# Patient Record
Sex: FEMALE | Race: WHITE | NOT HISPANIC OR LATINO | URBAN - METROPOLITAN AREA
[De-identification: names, ages, dates, MRNs, and addresses within clinical notes are randomized per-mention and may not be internally consistent; named-entity substitution may affect disease eponyms.]

---

## 2017-02-24 ENCOUNTER — ALLIED HEALTH/NURSE VISIT (OUTPATIENT)
Dept: OBGYN | Facility: CLINIC | Age: 52
End: 2017-02-24
Payer: COMMERCIAL

## 2017-02-24 DIAGNOSIS — Z23 NEED FOR VACCINATION: Primary | ICD-10-CM

## 2017-02-24 PROCEDURE — 90746 HEPB VACCINE 3 DOSE ADULT IM: CPT | Mod: ZF

## 2017-02-24 PROCEDURE — 25000128 H RX IP 250 OP 636: Mod: ZF

## 2017-02-24 PROCEDURE — 90471 IMMUNIZATION ADMIN: CPT | Mod: ZF

## 2017-06-24 ENCOUNTER — HEALTH MAINTENANCE LETTER (OUTPATIENT)
Age: 52
End: 2017-06-24

## 2017-08-23 ENCOUNTER — RADIANT APPOINTMENT (OUTPATIENT)
Dept: MAMMOGRAPHY | Facility: CLINIC | Age: 52
End: 2017-08-23

## 2017-08-23 DIAGNOSIS — Z12.31 VISIT FOR SCREENING MAMMOGRAM: ICD-10-CM

## 2017-08-28 ASSESSMENT — ENCOUNTER SYMPTOMS
HOT FLASHES: 1
NIGHT SWEATS: 1
ARTHRALGIAS: 1
FATIGUE: 1
ALTERED TEMPERATURE REGULATION: 1
NECK PAIN: 1

## 2017-08-28 ASSESSMENT — ANXIETY QUESTIONNAIRES
GAD7 TOTAL SCORE: 2
1. FEELING NERVOUS, ANXIOUS, OR ON EDGE: NOT AT ALL
GAD7 TOTAL SCORE: 2
4. TROUBLE RELAXING: SEVERAL DAYS
2. NOT BEING ABLE TO STOP OR CONTROL WORRYING: SEVERAL DAYS
7. FEELING AFRAID AS IF SOMETHING AWFUL MIGHT HAPPEN: NOT AT ALL
6. BECOMING EASILY ANNOYED OR IRRITABLE: NOT AT ALL
5. BEING SO RESTLESS THAT IT IS HARD TO SIT STILL: NOT AT ALL
3. WORRYING TOO MUCH ABOUT DIFFERENT THINGS: NOT AT ALL
GAD7 TOTAL SCORE: 2
7. FEELING AFRAID AS IF SOMETHING AWFUL MIGHT HAPPEN: NOT AT ALL

## 2017-08-29 ASSESSMENT — ANXIETY QUESTIONNAIRES: GAD7 TOTAL SCORE: 2

## 2017-09-08 ENCOUNTER — OFFICE VISIT (OUTPATIENT)
Dept: FAMILY MEDICINE | Facility: CLINIC | Age: 52
End: 2017-09-08
Attending: FAMILY MEDICINE
Payer: COMMERCIAL

## 2017-09-08 VITALS
WEIGHT: 138.7 LBS | DIASTOLIC BLOOD PRESSURE: 81 MMHG | SYSTOLIC BLOOD PRESSURE: 118 MMHG | HEIGHT: 67 IN | BODY MASS INDEX: 21.77 KG/M2 | HEART RATE: 61 BPM

## 2017-09-08 DIAGNOSIS — Z00.00 ENCOUNTER FOR ROUTINE ADULT HEALTH EXAMINATION WITHOUT ABNORMAL FINDINGS: Primary | ICD-10-CM

## 2017-09-08 PROCEDURE — 99212 OFFICE O/P EST SF 10 MIN: CPT | Mod: ZF

## 2017-09-08 NOTE — PROGRESS NOTES
CC: Shi Baker is a 50 year old female who presents for routine health maintenance exam.  LMP: No LMP recorded. Patient is not currently having periods (Reason: Birth Control).     HPI:   1. IBS with constipiation--Much better, now eating limited dairy, feelng much better,       2. Vitamin D deficiency--picked up at GI visit, now on daily 5000 units     3. Raynaud's--fingers turn white and dark purple, painful but no ulcerations. Happens with cold weather, holding cold drink. Not in toes. No changes from last year.   4. Breast--no concerns. Had mammogram today, all normal in past  5. Gyne-- Mirena IUD placed 2015, no menses. No problems, continues night sweats, mild hot flashes. Had DVT in past, so stopped OCP, and menses were regular heavy, switched to Mirena. Last Pap 2015, negative with negative HPV. Had LEEP with HEIKE 2-3,  last abnormal. No vaginal symptoms.  Some urinary urgency, but only wake 1/night.    HTN with 1st pregnancy. 7-8# babies.   Not currently sexually active with partner. 10 partners men only. No STI, HIV test over 20 years ago. HPV vaccinated, has traveled to [place] and [place], but no documentation Hep B vaccine. No sexual concerns, no hx abuse.      6. Bone--decreased dairy, taking Vit D, ? Calcium. Activity: walking/biking daily, at least 30 min.  7. HCM--dtaP , lipids normal 2014, glucose TSH normal within 1-2 years.   colonscopy 2016, +polyp, return in 5 years.      PMH:  Ruptured appendix  DVT no hospiltizations (below knee)               Patient Active Problem List   Diagnosis     Irritable bowel syndrome with constipation     History of colonic polyps     Melanocytic nevus of trunk     Vitamin D deficiency              Current Outpatient Prescriptions           Current Outpatient Prescriptions   Medication Sig Dispense Refill     vitamin D (ERGOCALCIFEROL) 58153 UNIT capsule Take one twice weekly for 12 weeks. Then lab test AND start Vitamin D 2 or Vitamin D 3  5000 units daily. Recheck after 3 to 6 months. 24 capsule 0         No Known Allergies   Social History    Social History            Social History     Marital Status: Single       Spouse Name: N/A     Number of Children: N/A     Years of Education: N/A          Occupational History     Not on file.             Social History Main Topics     Smoking status: Never Smoker      Smokeless tobacco: Not on file     Alcohol Use: 0.0 oz/week       0 Standard drinks or equivalent per week         Comment: Occasional     Drug Use: No     Sexual Activity:        Partners: Male       Birth Control/ Protection: IUD         Comment: mirena           Other Topics Concern     Not on file      Social History Narrative          Family History           Family History   Problem Relation Age of Onset     Ovarian Cancer Mother         neg genetic testing     Unknown/Adopted No family hx of         colon cancer, polyps, AID     Skin Cancer No family hx of         no skin cancer     Psoriasis Son         27 2015, difficult to treat     Psoriasis Son         21 2015, easier to treat         Patient adopted, but met mother while mother in her 30's--thinks BRCA  Negative.     SH:  Never smoker  ETOH--1/week, 2/sitting  No rec. Drug use ever  Vegetarian +fish  Children 29, 23  Human resources--standing desk     Answers for HPI/ROS submitted by the patient on 8/28/2017   TED 7 TOTAL SCORE: 2  PHQ-2 Score: 0  General Symptoms: Yes  Skin Symptoms: Yes  HENT Symptoms: No  EYE SYMPTOMS: Yes  HEART SYMPTOMS: No  LUNG SYMPTOMS: No  INTESTINAL SYMPTOMS: No  URINARY SYMPTOMS: No  GYNECOLOGIC SYMPTOMS: Yes  BREAST SYMPTOMS: No  SKELETAL SYMPTOMS: Yes  BLOOD SYMPTOMS: No  NERVOUS SYSTEM SYMPTOMS: No  MENTAL HEALTH SYMPTOMS: No  Fatigue: Yes--if not sleeping well  Night sweats: Yes  Feeling hot or cold when others believe the temperature is normal: Yes  Color changes of hands/feet in cold : Yes  Dry eyes: Yes=--just had PRK in one eye  Neck pain:  "Yes--need to crack neck to relieve sense of pressure  Joint pain: Yes--if too strenuos exercise, impact exercise bothers knees  Hot flashes: Yes    PE  Blood pressure 118/81, pulse 61, height 1.702 m (5' 7\"), weight 62.9 kg (138 lb 11.2 oz).  Body mass index is 21.72 kg/(m^2).  Constitutional: Well appearing woman in no acute distress.   Psychological: appropriate mood.  Eyes: anicteric, normal extra-ocular movements,  pupils are equal and reactive to light.   Ears, Nose and Throat: tympanic membranes clear, nose clear and free of lesions, throat clear, moist mucous membrames, neck supple with full range of motion.    Neck: No thyroidmegaly. No jugular venous distension, no carotid bruits.  Cardiovascular: regular rate and rhythm, normal S1 and S2, no murmurs, rubs or gallops, peripheral pulses full and symmetric   Respiratory: clear to auscultation, no wheezes or crackles, normal breath sounds.  Breast: Breast and  exam not indicated for this patient according to AAFP/ USPSTF clinical guidelines. y.   Gastrointestinal: positive bowel sounds, nontender, no hepatosplenomegaly, no masses. No guarding or rebound.  Lymphatic: no lymphadenopathy.  Musculoskeletal: full range of motion, no edema and motor strength is equal in the upper and lower extremities    Skin: no concerning lesions, no jaundice.  Neurological: cranial nerves intact, normal strength and sensation, reflexes at patella and biceps normal, normal gait, no tremor.   Monofilament Foot Exam: n/a  A/P  1. HCM--Up to date on immunizations, cancer screening and cardiovascular preventive care. Counseled to continue on weight bearing exercise, Maintenance Vit D 5000 units daily  2. Menopausal symptoms--Counseled regarding managmeent of hot flashes, including nonpharmacologic and medications.    Return  1 year      "

## 2017-09-08 NOTE — LETTER
2017       RE: Shi Baker  [address]  [address]  [place] [facility] [address]     Dear Colleague,    Thank you for referring your patient, Shi Baker, to the [facility] at [facility]. Please see a copy of my visit note below.    CC: Shi Baker is a 50 year old female who presents for routine health maintenance exam.  LMP: No LMP recorded. Patient is not currently having periods (Reason: Birth Control).     HPI:   1. IBS with constipiation--Much better, now eating limited dairy, feelng much better,       2. Vitamin D deficiency--picked up at GI visit, now on daily 5000 units     3. Raynaud's--fingers turn white and dark purple, painful but no ulcerations. Happens with cold weather, holding cold drink. Not in toes. No changes from last year.   4. Breast--no concerns. Had mammogram today, all normal in past  5. Gyne-- Mirena IUD placed 2015, no menses. No problems, continues night sweats, mild hot flashes. Had DVT in past, so stopped OCP, and menses were regular heavy, switched to Mirena. Last Pap 2015, negative with negative HPV. Had LEEP with HEIKE 2-3,  last abnormal. No vaginal symptoms.  Some urinary urgency, but only wake 1/night.    HTN with 1st pregnancy. 7-8# babies.   Not currently sexually active with partner. 10 partners men only. No STI, HIV test over 20 years ago. HPV vaccinated, has traveled to [place] and [place], but no documentation Hep B vaccine. No sexual concerns, no hx abuse.      6. Bone--decreased dairy, taking Vit D, ? Calcium. Activity: walking/biking daily, at least 30 min.  7. HCM--dtaP , lipids normal 2014, glucose TSH normal within 1-2 years.   colonscopy 2016, +polyp, return in 5 years.      PMH:  Ruptured appendix  DVT no hospiltizations (below knee)               Patient Active Problem List   Diagnosis     Irritable bowel syndrome with constipation     History of colonic polyps     Melanocytic  nevus of trunk     Vitamin D deficiency              Current Outpatient Prescriptions           Current Outpatient Prescriptions   Medication Sig Dispense Refill     vitamin D (ERGOCALCIFEROL) 27171 UNIT capsule Take one twice weekly for 12 weeks. Then lab test AND start Vitamin D 2 or Vitamin D 3 5000 units daily. Recheck after 3 to 6 months. 24 capsule 0         No Known Allergies   Social History    Social History            Social History     Marital Status: Single       Spouse Name: N/A     Number of Children: N/A     Years of Education: N/A          Occupational History     Not on file.             Social History Main Topics     Smoking status: Never Smoker      Smokeless tobacco: Not on file     Alcohol Use: 0.0 oz/week       0 Standard drinks or equivalent per week         Comment: Occasional     Drug Use: No     Sexual Activity:        Partners: Male       Birth Control/ Protection: IUD         Comment: mirena           Other Topics Concern     Not on file      Social History Narrative          Family History           Family History   Problem Relation Age of Onset     Ovarian Cancer Mother         neg genetic testing     Unknown/Adopted No family hx of         colon cancer, polyps, AID     Skin Cancer No family hx of         no skin cancer     Psoriasis Son         27 2015, difficult to treat     Psoriasis Son         21 2015, easier to treat         Patient adopted, but met mother while mother in her 30's--thinks BRCA  Negative.     SH:  Never smoker  ETOH--1/week, 2/sitting  No rec. Drug use ever  Vegetarian +fish  Children 29, 23  Human resources--standing desk     Answers for HPI/ROS submitted by the patient on 8/28/2017   TED 7 TOTAL SCORE: 2  PHQ-2 Score: 0  General Symptoms: Yes  Skin Symptoms: Yes  HENT Symptoms: No  EYE SYMPTOMS: Yes  HEART SYMPTOMS: No  LUNG SYMPTOMS: No  INTESTINAL SYMPTOMS: No  URINARY SYMPTOMS: No  GYNECOLOGIC SYMPTOMS: Yes  BREAST SYMPTOMS: No  SKELETAL SYMPTOMS: Yes  BLOOD  "SYMPTOMS: No  NERVOUS SYSTEM SYMPTOMS: No  MENTAL HEALTH SYMPTOMS: No  Fatigue: Yes--if not sleeping well  Night sweats: Yes  Feeling hot or cold when others believe the temperature is normal: Yes  Color changes of hands/feet in cold : Yes  Dry eyes: Yes=--just had PRK in one eye  Neck pain: Yes--need to crack neck to relieve sense of pressure  Joint pain: Yes--if too strenuos exercise, impact exercise bothers knees  Hot flashes: Yes    PE  Blood pressure 118/81, pulse 61, height 1.702 m (5' 7\"), weight 62.9 kg (138 lb 11.2 oz).  Body mass index is 21.72 kg/(m^2).  Constitutional: Well appearing woman in no acute distress.   Psychological: appropriate mood.  Eyes: anicteric, normal extra-ocular movements,  pupils are equal and reactive to light.   Ears, Nose and Throat: tympanic membranes clear, nose clear and free of lesions, throat clear, moist mucous membrames, neck supple with full range of motion.    Neck: No thyroidmegaly. No jugular venous distension, no carotid bruits.  Cardiovascular: regular rate and rhythm, normal S1 and S2, no murmurs, rubs or gallops, peripheral pulses full and symmetric   Respiratory: clear to auscultation, no wheezes or crackles, normal breath sounds.  Breast: Breast and  exam not indicated for this patient according to AAFP/ USPSTF clinical guidelines. y.   Gastrointestinal: positive bowel sounds, nontender, no hepatosplenomegaly, no masses. No guarding or rebound.  Lymphatic: no lymphadenopathy.  Musculoskeletal: full range of motion, no edema and motor strength is equal in the upper and lower extremities    Skin: no concerning lesions, no jaundice.  Neurological: cranial nerves intact, normal strength and sensation, reflexes at patella and biceps normal, normal gait, no tremor.   Monofilament Foot Exam: n/a  A/P  1. HCM--Up to date on immunizations, cancer screening and cardiovascular preventive care. Counseled to continue on weight bearing exercise, Maintenance Vit D 5000 units " daily  2. Menopausal symptoms--Counseled regarding managmeent of hot flashes, including nonpharmacologic and medications.    Return  1 year        Again, thank you for allowing me to participate in the care of your patient.      Sincerely,    Yo West MD

## 2017-09-08 NOTE — MR AVS SNAPSHOT
After Visit Summary   9/8/2017    Shi Baker    MRN: [id number]           Patient Information     Date Of Birth          1965        Visit Information        Provider Department      9/8/2017 9:20 AM Yo West MD [facility]        Today's Diagnoses     Encounter for routine adult health examination without abnormal findings    -  1       Follow-ups after your visit        Follow-up notes from your care team     Return in about 1 year (around 9/8/2018).      Who to contact     Please call your clinic at [phone] to:    Ask questions about your health    Make or cancel appointments    Discuss your medicines    Learn about your test results    Speak to your doctor   If you have compliments or concerns about an experience at your clinic, or if you wish to file a complaint, please contact [facility] Patient Relations at [phone] or email us at [email or web address]         Additional Information About Your Visit        MyChart Information     [employer] gives you secure access to your electronic health record. If you see a primary care provider, you can also send messages to your care team and make appointments. If you have questions, please call your primary care clinic.  If you do not have a primary care provider, please call [phone] and they will assist you.      [employer] is an electronic gateway that provides easy, online access to your medical records. With [employer], you can request a clinic appointment, read your test results, renew a prescription or communicate with your care team.     To access your existing account, please contact your [facility] or call [phone] for assistance.        Care EveryWhere ID     This is your Care EveryWhere ID. This could be used by other organizations to access your [place] medical records  [id number]        Your Vitals Were     Pulse Height BMI  "(Body Mass Index)             61 1.702 m (5' 7\") 21.72 kg/m2          Blood Pressure from Last 3 Encounters:   09/08/17 118/81   07/22/16 121/85   04/08/16 126/77    Weight from Last 3 Encounters:   09/08/17 62.9 kg (138 lb 11.2 oz)   07/22/16 62.8 kg (138 lb 8 oz)   04/08/16 65.1 kg (143 lb 9.6 oz)              Today, you had the following     No orders found for display       Primary Care Provider Office Phone # Fax #    Yo West [phone]       [address] [place] [id number]        Equal Access to Services     LOIDA LOCO : Hadmabel Biggs, waarianna angulo, qaybta kaalmada suzy, penny canales . So [facility] [phone].    ATENCIÓN: Si habla español, tiene a robison disposición servicios gratuitos de asistencia lingüística. [facility] [phone].    We comply with applicable federal civil rights laws and [place] laws. We do not discriminate on the basis of race, color, national origin, age, disability sex, sexual orientation or gender identity.            Thank you!     Thank you for choosing [facility]  for your care. Our goal is always to provide you with excellent care. Hearing back from our patients is one way we can continue to improve our services. Please take a few minutes to complete the written survey that you may receive in the mail after your visit with us. Thank you!             Your Updated Medication List - Protect others around you: Learn how to safely use, store and throw away your medicines at www.disposemymeds.org.          This list is accurate as of: 9/8/17 11:59 PM.  Always use your most recent med list.                   Brand Name Dispense Instructions for use Diagnosis    vitamin D 26185 UNIT capsule    ERGOCALCIFEROL    24 capsule    Take one twice weekly for 12 weeks. Then lab test AND start Vitamin D 2 or Vitamin D 3 5000 units daily. Recheck after 3 to 6 months.    Vitamin D deficiency       "

## 2017-12-14 ENCOUNTER — OFFICE VISIT (OUTPATIENT)
Dept: DERMATOLOGY | Facility: CLINIC | Age: 52
End: 2017-12-14
Payer: COMMERCIAL

## 2017-12-14 DIAGNOSIS — L85.3 XEROSIS OF SKIN: Primary | ICD-10-CM

## 2017-12-14 DIAGNOSIS — L82.1 SEBORRHEIC KERATOSIS: ICD-10-CM

## 2017-12-14 DIAGNOSIS — D22.9 MULTIPLE MELANOCYTIC NEVI: ICD-10-CM

## 2017-12-14 ASSESSMENT — PAIN SCALES - GENERAL: PAINLEVEL: NO PAIN (0)

## 2017-12-14 NOTE — LETTER
12/14/2017       RE: Shi Baker  [address]  [address]  [place] [address]     Dear Colleague,    Thank you for referring your patient, Shi Baker, to the [facility] at [facility]. Please see a copy of my visit note below.    [facility] Dermatology Note      Dermatology Problem List:  1. Multiple benign melanocytic nevi  2. Dry skin  3. Seborrheic keratosis    Encounter Date: Dec 14, 2017    CC:   Chief Complaint   Patient presents with     Derm Problem     Shi is here today for a skin check, states she has a concerning area on her collarbone.           History of Present Illness:  Ms. Shi Baker is a 52 year old female who presents for a skin check. She reports no personal history of skin cancer, but has had a number of moles removed. She has not had to have re-excisions at any site of biopsy that she can recall. She was last seen 2/2016 when she did not have any concerning findings.     Today she denies any moles that are growing in size, changing in color. She denies any spots that are bleeding or tender. Does have a spot on her collarbone that is getting darker that she is wondering about.    She also reports dry skin. She has been using a moisturizer called Alba from [employer] on a regular basis, tries to use fragrance free soap. She has no itch.     Past Medical History:   Patient Active Problem List   Diagnosis     Irritable bowel syndrome with constipation     History of colonic polyps     Melanocytic nevus of trunk     Vitamin D deficiency     Past Medical History:   Diagnosis Date     Abnormal Pap smear 2009    Past LEEP and colposcopy     DVT (deep vein thrombosis) in pregnancy (H) 2005    at time of bunionectomy, bilateral.     Raynaud's disease 2005     Past Surgical History:   Procedure Laterality Date     APPENDECTOMY  2002    ruptured     COLONOSCOPY       EYE SURGERY  3/2017    [facility]     ORTHOPEDIC SURGERY   Bunionectomy    2005? 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    Family History:  There is no family history of skin cancer that she knows of, she is adopted. Son has psoriasis.    Medications:  Current Outpatient Prescriptions   Medication Sig Dispense Refill     vitamin D (ERGOCALCIFEROL) 06579 UNIT capsule Take one twice weekly for 12 weeks. Then lab test AND start Vitamin D 2 or Vitamin D 3 5000 units daily. Recheck after 3 to 6 months. 24 capsule 0        No Known Allergies      Review of Systems:  -As per HPI  -Constitutional: The patient denies fatigue, fevers, chills, unintended weight loss, and night sweats.  -HEENT: Patient denies nonhealing oral sores.  -Skin: As above in HPI. No additional skin concerns.    Physical exam:  Vitals: There were no vitals taken for this visit.  GEN: This is a well developed, well-nourished female in no acute distress, in a pleasant mood.    SKIN: Full skin, which includes the head/face, both arms, chest, back, abdomen,both legs, groin, buttocks, digits and/or nails, was examined.  -Marcus Type II  -There is a brown elevated waxy papule on right collarbone.  -Few scattered regular brown pigmented macules and papules are identified on the trunk and extremities.   -No other lesions of concern on areas examined.     Impression/Plan:  1. Multiple clinically benign nevi on the trunk and extremities    Sun precaution was advised including the use of sun screens of SPF 30 or higher, sun protective clothing, and avoidance of tanning beds.  2. Dry skin    Continue use moisturizer.   .   3. Seborrheic keratosis     Benign nature was discussed.     Follow-up in 1-2 years, earlier for new or changing lesions.      staffed the patient.    Staff Involved:  Scribed by Yessica Zapata, MS6 for Dr. Jeronimo    The medical student acted as a scribe with respect to this  patient.  I have performed all the key elements of the history and physical.    Yosi Jeronimo MD  Dermatology Attending

## 2017-12-14 NOTE — NURSING NOTE
Dermatology Rooming Note    Shi Baker's goals for this visit include:   Chief Complaint   Patient presents with     Derm Problem     Shi is here today for a skin check, states she has a concerning area on her collarbone.         Ale Laureano LPN

## 2017-12-14 NOTE — PROGRESS NOTES
[facility] Dermatology Note      Dermatology Problem List:  1. Multiple benign melanocytic nevi  2. Dry skin  3. Seborrheic keratosis    Encounter Date: Dec 14, 2017    CC:   Chief Complaint   Patient presents with     Derm Problem     Shi is here today for a skin check, states she has a concerning area on her collarbone.           History of Present Illness:  Ms. Shi Baker is a 52 year old female who presents for a skin check. She reports no personal history of skin cancer, but has had a number of moles removed. She has not had to have re-excisions at any site of biopsy that she can recall. She was last seen 2/2016 when she did not have any concerning findings.     Today she denies any moles that are growing in size, changing in color. She denies any spots that are bleeding or tender. Does have a spot on her collarbone that is getting darker that she is wondering about.    She also reports dry skin. She has been using a moisturizer called Alba from [employer] on a regular basis, tries to use fragrance free soap. She has no itch.     Past Medical History:   Patient Active Problem List   Diagnosis     Irritable bowel syndrome with constipation     History of colonic polyps     Melanocytic nevus of trunk     Vitamin D deficiency     Past Medical History:   Diagnosis Date     Abnormal Pap smear 2009    Past LEEP and colposcopy     DVT (deep vein thrombosis) in pregnancy (H) 2005    at time of bunionectomy, bilateral.     Raynaud's disease 2005     Past Surgical History:   Procedure Laterality Date     APPENDECTOMY  2002    ruptured     COLONOSCOPY       EYE SURGERY  3/2017    [facility]     ORTHOPEDIC SURGERY  Bunionectomy    2005? 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    Family History:  There is no family history of skin cancer that she knows of, she is adopted. Son has  psoriasis.    Medications:  Current Outpatient Prescriptions   Medication Sig Dispense Refill     vitamin D (ERGOCALCIFEROL) 25559 UNIT capsule Take one twice weekly for 12 weeks. Then lab test AND start Vitamin D 2 or Vitamin D 3 5000 units daily. Recheck after 3 to 6 months. 24 capsule 0        No Known Allergies      Review of Systems:  -As per HPI  -Constitutional: The patient denies fatigue, fevers, chills, unintended weight loss, and night sweats.  -HEENT: Patient denies nonhealing oral sores.  -Skin: As above in HPI. No additional skin concerns.    Physical exam:  Vitals: There were no vitals taken for this visit.  GEN: This is a well developed, well-nourished female in no acute distress, in a pleasant mood.    SKIN: Full skin, which includes the head/face, both arms, chest, back, abdomen,both legs, groin, buttocks, digits and/or nails, was examined.  -Marcus Type II  -There is a brown elevated waxy papule on right collarbone.  -Few scattered regular brown pigmented macules and papules are identified on the trunk and extremities.   -No other lesions of concern on areas examined.     Impression/Plan:  1. Multiple clinically benign nevi on the trunk and extremities    Sun precaution was advised including the use of sun screens of SPF 30 or higher, sun protective clothing, and avoidance of tanning beds.  2. Dry skin    Continue use moisturizer.   .   3. Seborrheic keratosis     Benign nature was discussed.     Follow-up in 1-2 years, earlier for new or changing lesions.      staffed the patient.    Staff Involved:  Scribed by Yessica Zapata MS6 for Dr. Jeronimo    The medical student acted as a scribe with respect to this patient.  I have performed all the key elements of the history and physical.    Yosi Jeronimo MD  Dermatology Attending

## 2017-12-14 NOTE — MR AVS SNAPSHOT
After Visit Summary   12/14/2017    Shi Baker    MRN: [id number]           Patient Information     Date Of Birth          1965        Visit Information        Provider Department      12/14/2017 3:15 PM Yosi Jeronimo MD [facility]        Today's Diagnoses     Xerosis of skin    -  1    Seborrheic keratosis        Multiple melanocytic nevi           Follow-ups after your visit        Who to contact     Please call your clinic at [phone] to:    Ask questions about your health    Make or cancel appointments    Discuss your medicines    Learn about your test results    Speak to your doctor   If you have compliments or concerns about an experience at your clinic, or if you wish to file a complaint, please contact [facility] Patient Relations at [phone] or email us at [email or web address]         Additional Information About Your Visit        MyChart Information     jiffstoret gives you secure access to your electronic health record. If you see a primary care provider, you can also send messages to your care team and make appointments. If you have questions, please call your primary care clinic.  If you do not have a primary care provider, please call [phone] and they will assist you.      Perf[employer]ix is an electronic gateway that provides easy, online access to your medical records. With [employer], you can request a clinic appointment, read your test results, renew a prescription or communicate with your care team.     To access your existing account, please contact your [facility] or call [phone] for assistance.        Care EveryWhere ID     This is your Care EveryWhere ID. This could be used by other organizations to access your [place] medical records  [id number]         Blood Pressure from Last 3 Encounters:   09/08/17 118/81   07/22/16 121/85   04/08/16 126/77    Weight from Last 3 Encounters:    09/08/17 62.9 kg (138 lb 11.2 oz)   07/22/16 62.8 kg (138 lb 8 oz)   04/08/16 65.1 kg (143 lb 9.6 oz)              Today, you had the following     No orders found for display       Primary Care Provider Office Phone # Fax #    Yo West [phone]       [address] [place] [address]        Equal Access to Services     LOIDA LOCO : Hadii aad ku hadasho Soomaali, waaxda luqadaha, qaybta kaalmada adeegyada, waxay idiin hayaan adeeg momo canales . So [facility] [phone].    ATENCIÓN: Si irvin henao, tiene a robison disposición servicios gratuitos de asistencia lingüística. Llame al [phone].    We comply with applicable federal civil rights laws and [place] laws. We do not discriminate on the basis of race, color, national origin, age, disability, sex, sexual orientation, or gender identity.            Thank you!     Thank you for choosing [facility]  for your care. Our goal is always to provide you with excellent care. Hearing back from our patients is one way we can continue to improve our services. Please take a few minutes to complete the written survey that you may receive in the mail after your visit with us. Thank you!             Your Updated Medication List - Protect others around you: Learn how to safely use, store and throw away your medicines at www.disposemymeds.org.          This list is accurate as of: 12/14/17 11:59 PM.  Always use your most recent med list.                   Brand Name Dispense Instructions for use Diagnosis    vitamin D 56638 UNIT capsule    ERGOCALCIFEROL    24 capsule    Take one twice weekly for 12 weeks. Then lab test AND start Vitamin D 2 or Vitamin D 3 5000 units daily. Recheck after 3 to 6 months.    Vitamin D deficiency

## 2017-12-17 PROBLEM — D22.9 MULTIPLE MELANOCYTIC NEVI: Status: ACTIVE | Noted: 2017-12-17

## 2017-12-17 PROBLEM — L85.3 XEROSIS OF SKIN: Status: ACTIVE | Noted: 2017-12-17

## 2017-12-17 PROBLEM — L82.1 SEBORRHEIC KERATOSIS: Status: ACTIVE | Noted: 2017-12-17

## 2018-09-14 ASSESSMENT — ENCOUNTER SYMPTOMS
CONSTIPATION: 1
HEADACHES: 1
ARTHRALGIAS: 1
NIGHT SWEATS: 1
NUMBNESS: 1
PALPITATIONS: 1
BLOATING: 1
NECK PAIN: 1
MUSCLE CRAMPS: 1
BRUISES/BLEEDS EASILY: 1
ALTERED TEMPERATURE REGULATION: 1
HOT FLASHES: 1

## 2018-09-14 ASSESSMENT — ANXIETY QUESTIONNAIRES
4. TROUBLE RELAXING: NOT AT ALL
2. NOT BEING ABLE TO STOP OR CONTROL WORRYING: NOT AT ALL
5. BEING SO RESTLESS THAT IT IS HARD TO SIT STILL: NOT AT ALL
GAD7 TOTAL SCORE: 2
3. WORRYING TOO MUCH ABOUT DIFFERENT THINGS: SEVERAL DAYS
1. FEELING NERVOUS, ANXIOUS, OR ON EDGE: SEVERAL DAYS
7. FEELING AFRAID AS IF SOMETHING AWFUL MIGHT HAPPEN: NOT AT ALL
6. BECOMING EASILY ANNOYED OR IRRITABLE: NOT AT ALL
GAD7 TOTAL SCORE: 2
7. FEELING AFRAID AS IF SOMETHING AWFUL MIGHT HAPPEN: NOT AT ALL

## 2018-09-15 ASSESSMENT — ANXIETY QUESTIONNAIRES: GAD7 TOTAL SCORE: 2

## 2018-09-19 ENCOUNTER — OFFICE VISIT (OUTPATIENT)
Dept: FAMILY MEDICINE | Facility: CLINIC | Age: 53
End: 2018-09-19
Attending: FAMILY MEDICINE
Payer: COMMERCIAL

## 2018-09-19 VITALS
DIASTOLIC BLOOD PRESSURE: 73 MMHG | HEART RATE: 67 BPM | HEIGHT: 67 IN | BODY MASS INDEX: 21.83 KG/M2 | WEIGHT: 139.1 LBS | SYSTOLIC BLOOD PRESSURE: 120 MMHG

## 2018-09-19 DIAGNOSIS — Z00.00 ENCOUNTER FOR ROUTINE ADULT HEALTH EXAMINATION WITHOUT ABNORMAL FINDINGS: ICD-10-CM

## 2018-09-19 DIAGNOSIS — R00.2 PALPITATIONS: Primary | ICD-10-CM

## 2018-09-19 DIAGNOSIS — Z12.39 SCREENING FOR BREAST CANCER: ICD-10-CM

## 2018-09-19 DIAGNOSIS — Z13.820 SCREENING FOR OSTEOPOROSIS: ICD-10-CM

## 2018-09-19 NOTE — PROGRESS NOTES
CC: Shi Baker is a 50 year old female who presents for routine health maintenance exam.  LMP: No LMP recorded. Patient is not currently having periods (Reason: Birth Control).      HPI:   1. Occ. Palpitations--1x/wk, last few seconds, fast heartbeat. Weight stable.  1. IBS with constipiation--doing well with cutting out dairy        2. Vitamin D -, now on daily 5000 units daily      3. Raynaud's-- Happens with cold weather, holding cold drink. Not in toes. No changes from last year.   4. Breast--no concerns. Due for mammogram,  all normal in past  5. Gyne-- Mirena IUD placed 2015, no menses. No problems, increasing hot flashes and night sweats, tolerable.  Had DVT in past, so stopped OCP, and menses were regular heavy, switched to Mirena. Last Pap 2015, negative with negative HPV. Had LEEP with HEIKE 2-3,  last abnormal. No vaginal symptoms.  Some urinary urgency, but wake 2/night.    HTN with 1st pregnancy. 7-8# babies.   Not currently sexually active with partner. 10 partners men only. No STI, HIV test over 20 years ago. HPV vaccinated, has traveled to [place] and [place]; Hep B vaccinated. No sexual concerns, no hx abuse.       6. Bone--decreased dairy, taking Vit D, ? Calcium. Activity: walking/biking daily, at least 30 min.  7. HCM--dtaP , lipids normal 2014, glucose TSH normal within 1-2 years. Flu vaccine done at work   colonscopy 2016, +polyp, return in 5 years.       8. Recent CXR for URI, noted mild degenerative changesi n Cervical and thoracic spine, and chronic anterior weding in thoracolumbar junction, possibly physiologic.    PMH:  Ruptured appendix  DVT no hospiltizations (below knee)                    Patient Active Problem List   Diagnosis     Irritable bowel syndrome with constipation     History of colonic polyps     Melanocytic nevus of trunk     Vitamin D deficiency                 Current Outpatient Prescriptions                Current Outpatient Prescriptions    Medication Sig Dispense Refill     vitamin D (ERGOCALCIFEROL) 83405 UNIT capsule Take one twice weekly for 12 weeks. Then lab test AND start Vitamin D 2 or Vitamin D 3 5000 units daily. Recheck after 3 to 6 months. 24 capsule 0           No Known Allergies    Social History    Social History                 Social History     Marital Status: Single         Spouse Name: N/A     Number of Children: N/A     Years of Education: N/A             Occupational History     Not on file.                   Social History Main Topics     Smoking status: Never Smoker      Smokeless tobacco: Not on file     Alcohol Use: 0.0 oz/week         0 Standard drinks or equivalent per week            Comment: Occasional     Drug Use: No     Sexual Activity:           Partners: Male         Birth Control/ Protection: IUD            Comment: mirena               Other Topics Concern     Not on file       Social History Narrative            Family History                Family History   Problem Relation Age of Onset     Ovarian Cancer Mother            neg genetic testing     Unknown/Adopted No family hx of            colon cancer, polyps, AID     Skin Cancer No family hx of            no skin cancer     Psoriasis Son            27 2015, difficult to treat     Psoriasis Son            21 2015, easier to treat           Patient adopted, but met mother while mother in her 30's--thinks BRCA  Negative.      SH:  Never smoker  ETOH--1/week, 2/sitting  No rec. Drug use ever  Vegetarian +fish  Children 29, 23  Human resources--standing desk      Answers for HPI/ROS submitted by the patient on 9/14/2018   TED 7 TOTAL SCORE: 2  PHQ-2 Score: 0  General Symptoms: Yes  Skin Symptoms: Yes  HENT Symptoms: No  EYE SYMPTOMS: Yes  HEART SYMPTOMS: Yes  LUNG SYMPTOMS: No  INTESTINAL SYMPTOMS: Yes  URINARY SYMPTOMS: No  GYNECOLOGIC SYMPTOMS: Yes  BREAST SYMPTOMS: No  SKELETAL SYMPTOMS: Yes  BLOOD SYMPTOMS: Yes  NERVOUS SYSTEM SYMPTOMS: Yes  MENTAL HEALTH  "SYMPTOMS: No  Night sweats: Yes  Feeling hot or cold when others believe the temperature is normal: Yes  Color changes of hands/feet in cold : Yes  Dry eyes: Yes--due past PRK  Fast or irregular heartbeat: Yes  Bloating: Yes  Constipation: Yes  Neck pain: Yes  Joint pain: Yes  Muscle cramps: Yes  Easy bleeding or bruising: Yes--always that way, bruises with bumping anything  Headache: Yes--stable, tension HA  Numbness: Yes--Raynaud's  Hot flashes: Yes  --knee pain, not interfere with activities; trying glucosamine, seems to help  : small bump just inside vaginal opening, not painful  Reviewed with patient today    PE  Blood pressure 120/73, pulse 67, height 1.702 m (5' 7\"), weight 63.1 kg (139 lb 1.6 oz).  Constitutional: Well appearing woman in no acute distress.   Psychological: appropriate mood.  Eyes: anicteric, normal extra-ocular movements,  pupils are equal and reactive to light.   Ears, Nose and Throat: tympanic membranes clear, nose clear and free of lesions, throat clear, moist mucous membrames, neck supple with full range of motion.    Neck: No thyroidmegaly. No jugular venous distension, no carotid bruits.  Cardiovascular: regular rate and rhythm, normal S1 and S2, no murmurs, rubs or gallops, peripheral pulses full and symmetric   Respiratory: clear to auscultation, no wheezes or crackles, normal breath sounds.    Gastrointestinal: positive bowel sounds, nontender, no hepatosplenomegaly, no masses. No guarding or rebound.  Genitourinary: External genitalia is normal appearance, except for 2-3mm mobile nontender very superficial mass just inside introitus/perineum, consistent with cyst. No enlargement of the Bartholin or Tipton glands. Urethral meatus normal. No visible discharge.    Lymphatic: no lymphadenopathy.  Musculoskeletal: full range of motion, no edema and motor strength is equal in the upper and lower extremities    Skin: no concerning lesions, no jaundice.  Neurological: cranial nerves " intact, normal strength and sensation, reflexes at patella and biceps normal, normal gait, no tremor.   Monofilament Foot Exam: n/a    A/P  1. HCM--due for mammogram, otherwise up to date on cancer screening, immunizations and CV risk factor screening  2. Vertebral wedging on CXR--given perimenopausal/postmenopausal status, will order DEXA  3. Palpitations--check TSH  4. Vulvar cyst--discussed finding with patient, reassurance given, to return if tender, growing.      F/u 1 year

## 2018-09-19 NOTE — MR AVS SNAPSHOT
After Visit Summary   9/19/2018    Shi Baker    MRN: [id number]           Patient Information     Date Of Birth          1965        Visit Information        Provider Department      9/19/2018 9:00 AM Yo West MD [facility]        Today's Diagnoses     Palpitations    -  1    Encounter for routine adult health examination without abnormal findings        Screening for breast cancer        Screening for osteoporosis           Follow-ups after your visit        Follow-up notes from your care team     Return in about 1 year (around 9/19/2019).      Your next 10 appointments already scheduled     Sep 28, 2018  7:15 AM CDT   MA SCREENING DIGITAL BILATERAL with [id number]   [facility] ([facility])    [address]  [facility] [address] [phone]           How do I prepare for my exam? (Food and drink instructions) No Food and Drink Restrictions.  How do I prepare for my exam? (Other instructions) Do not use any powder, lotion or deodorant under your arms or on your breast. If you do, we will ask you to remove it before your exam.  What should I wear: Wear comfortable, two-piece clothing.  How long does the exam take: Most scans will take 15 minutes.  What should I bring: Bring any previous mammograms from other facilities or have them mailed to the breast center.  Do I need a :  No  is needed.  What do I need to tell my doctor: If you have any allergies, tell your care team.  What should I do after the exam: No restrictions, You may resume normal activities.  What is this test: This test is an x-ray of the breast to look for breast disease. The breast is pressed between two plates to flatten and spread the tissue. An X-ray is taken of the breast from different angles.  Who should I call with questions: If you have any questions, please call the Imaging Department where you will have  "your exam. Directions, parking instructions, and other information is available on our website, [email or web address].  Other information about my exam Three-dimensional (3D) mammograms are available at [facility] locations in [facility], [facility], [place], [facility] and [place].  Health locations include [place] and the [facility] in [place].  Benefits of 3D mammograms include * Improved rate of cancer detection * Decreases your chance of having to go back for more tests, which means fewer: * \"False-positive\" results (This means that there is an abnormal area but it isn't cancer.) * Invasive testing procedures, such as a biopsy or surgery * Can provide clearer images of the breast if you have dense breast tissue.  *3D mammography is an optional exam that anyone can have with a 2D mammogram. It doesn't replace or take the place of a 2D mammogram. 2D mammograms remain an effective screening test for all women.  Not all insurance companies cover the cost of a 3D mammogram. Check with your insurance. Three-dimensional (3D) mammograms are available at [facility] locations in [facility], [facility], [facility], and [place]. Health locations include [place] and [facility] in [place]. Benefits of 3D mammograms include: - Improved rate of cancer detection - Decreases your chance of having to go back for more tests, which means fewer: - \"False-positive\" results (This means that there is an abnormal area but it isn't cancer.) - Invasive testing procedures, such as a biopsy or surgery - Can provide clearer images of the breast if you have dense breast tissue. 3D mammography is an optional exam that anyone can have with a 2D mammogram. It doesn't replace or take the place of a 2D mammogram. 2D mammograms remain an effective screening test for all women.  Not all insurance companies cover the cost of a 3D " mammogram. Check with your insurance.            Sep 28, 2018  7:30 AM CDT   DX HIP/PELVIS/SPINE with [id number]   [facility] ([facility])    [address] [address] [phone]           How do I prepare for my exam? (Food and drink instructions) No Food and Drink Restrictions.  How do I prepare for my exam? (Other instructions) Please do not take any of the following 24 hours prior to the day of your exam: vitamins, calcium tablets, antacids.  What should I wear: If possible, please wear clothes without metal (snaps, zippers). A sweat suit works well.  How long does the exam take: The exam takes about 20 minutes.  What should I bring: Bring a list of your current medicines to your exam (including vitamins, minerals and over-the-counter drugs).  Do I need a :  No  is needed.  What should I do after the exam: No restrictions, You may resume normal activities.  How do I prepare for my exam? (Food and drink instructions) A DEXA scan is a bone-density scan. It uses a low level of radiation to check the strength of your bones. As you lie on a padded table, a machine will take X-rays. We most often scan the hips and lower spine.  Who should I call with questions: If you have any questions, please call the Imaging Department where you will have your exam. Directions, parking instructions, and other information is available on our website, [email or web address].              Who to contact     Please call your clinic at [phone] to:    Ask questions about your health    Make or cancel appointments    Discuss your medicines    Learn about your test results    Speak to your doctor            Additional Information About Your Visit        [employer] Information     [employer] gives you secure access to your electronic health record. If you see a primary care provider, you can also send messages to your care team and make appointments. If you  "have questions, please call your primary care clinic.  If you do not have a primary care provider, please call [phone] and they will assist you.      [employer]ez is an electronic gateway that provides easy, online access to your medical records. With [employer], you can request a clinic appointment, read your test results, renew a prescription or communicate with your care team.     To access your existing account, please contact your [facility] or call [phone] for assistance.        Care EveryWhere ID     This is your Care EveryWhere ID. This could be used by other organizations to access your [place] medical records  [id number]        Your Vitals Were     Pulse Height BMI (Body Mass Index)             67 1.702 m (5' 7\") 21.79 kg/m2          Blood Pressure from Last 3 Encounters:   09/19/18 120/73   09/08/17 118/81   07/22/16 121/85    Weight from Last 3 Encounters:   09/19/18 63.1 kg (139 lb 1.6 oz)   09/08/17 62.9 kg (138 lb 11.2 oz)   07/22/16 62.8 kg (138 lb 8 oz)               Primary Care Provider Office Phone # Fax #    Yo West [phone]       [address] [place] [address]        Equal Access to Services     LOIDA LOCO AH: Hadii kelley ku hadasho Soomaali, waaxda luqadaha, qaybta kaalmada adeegyada, penny tinoco haysalinas canales . So [facility] [phone].    ATENCIÓN: Si habla español, tiene a robison disposición servicios gratuitos de asistencia lingüística. Llame al [phone].    We comply with applicable federal civil rights laws and [place] laws. We do not discriminate on the basis of race, color, national origin, age, disability, sex, sexual orientation, or gender identity.            Thank you!     Thank you for choosing [facility]  for your care. Our goal is always to provide you with excellent care. Hearing back from our patients is one way we can continue to improve our services. Please take a few " minutes to complete the written survey that you may receive in the mail after your visit with us. Thank you!             Your Updated Medication List - Protect others around you: Learn how to safely use, store and throw away your medicines at www.disposemymeds.org.          This list is accurate as of 9/19/18 11:59 PM.  Always use your most recent med list.                   Brand Name Dispense Instructions for use Diagnosis    vitamin D 45012 UNIT capsule    ERGOCALCIFEROL    24 capsule    Take one twice weekly for 12 weeks. Then lab test AND start Vitamin D 2 or Vitamin D 3 5000 units daily. Recheck after 3 to 6 months.    Vitamin D deficiency

## 2018-09-19 NOTE — LETTER
2018       RE: Shi Baker  [address]  [address]  [facility] [address]     Dear Colleague,    Thank you for referring your patient, Shi Baker, to the [facility] at [facility]. Please see a copy of my visit note below.    CC: Shi Baker is a 50 year old female who presents for routine health maintenance exam.  LMP: No LMP recorded. Patient is not currently having periods (Reason: Birth Control).      HPI:   1. Occ. Palpitations--1x/wk, last few seconds, fast heartbeat. Weight stable.  1. IBS with constipiation--doing well with cutting out dairy        2. Vitamin D -, now on daily 5000 units daily      3. Raynaud's-- Happens with cold weather, holding cold drink. Not in toes. No changes from last year.   4. Breast--no concerns. Due for mammogram,  all normal in past  5. Gyne-- Mirena IUD placed 2015, no menses. No problems, increasing hot flashes and night sweats, tolerable.  Had DVT in past, so stopped OCP, and menses were regular heavy, switched to Mirena. Last Pap 2015, negative with negative HPV. Had LEEP with HEIKE 2-3,  last abnormal. No vaginal symptoms.  Some urinary urgency, but wake 2/night.    HTN with 1st pregnancy. 7-8# babies.   Not currently sexually active with partner. 10 partners men only. No STI, HIV test over 20 years ago. HPV vaccinated, has traveled to [place] and [place]; Hep B vaccinated. No sexual concerns, no hx abuse.       6. Bone--decreased dairy, taking Vit D, ? Calcium. Activity: walking/biking daily, at least 30 min.  7. HCM--dtaP , lipids normal 2014, glucose TSH normal within 1-2 years. Flu vaccine done at work   colonscopy 2016, +polyp, return in 5 years.       8. Recent CXR for URI, noted mild degenerative changesi n Cervical and thoracic spine, and chronic anterior weding in thoracolumbar junction, possibly physiologic.    PMH:  Ruptured appendix  DVT no hospiltizations (below  knee)                    Patient Active Problem List   Diagnosis     Irritable bowel syndrome with constipation     History of colonic polyps     Melanocytic nevus of trunk     Vitamin D deficiency                 Current Outpatient Prescriptions                Current Outpatient Prescriptions   Medication Sig Dispense Refill     vitamin D (ERGOCALCIFEROL) 36371 UNIT capsule Take one twice weekly for 12 weeks. Then lab test AND start Vitamin D 2 or Vitamin D 3 5000 units daily. Recheck after 3 to 6 months. 24 capsule 0           No Known Allergies    Social History    Social History                 Social History     Marital Status: Single         Spouse Name: N/A     Number of Children: N/A     Years of Education: N/A             Occupational History     Not on file.                   Social History Main Topics     Smoking status: Never Smoker      Smokeless tobacco: Not on file     Alcohol Use: 0.0 oz/week         0 Standard drinks or equivalent per week            Comment: Occasional     Drug Use: No     Sexual Activity:           Partners: Male         Birth Control/ Protection: IUD            Comment: mirena               Other Topics Concern     Not on file       Social History Narrative            Family History                Family History   Problem Relation Age of Onset     Ovarian Cancer Mother            neg genetic testing     Unknown/Adopted No family hx of            colon cancer, polyps, AID     Skin Cancer No family hx of            no skin cancer     Psoriasis Son            27 2015, difficult to treat     Psoriasis Son            21 2015, easier to treat           Patient adopted, but met mother while mother in her 30's--thinks BRCA  Negative.      SH:  Never smoker  ETOH--1/week, 2/sitting  No rec. Drug use ever  Vegetarian +fish  Children 29, 23  Human resources--standing desk  --knee pain, not interfere with activities; trying glucosamine, seems to help  : small bump just inside vaginal  "opening, not painful  Reviewed with patient today    PE  Blood pressure 120/73, pulse 67, height 1.702 m (5' 7\"), weight 63.1 kg (139 lb 1.6 oz).  Constitutional: Well appearing woman in no acute distress.   Psychological: appropriate mood.  Eyes: anicteric, normal extra-ocular movements,  pupils are equal and reactive to light.   Ears, Nose and Throat: tympanic membranes clear, nose clear and free of lesions, throat clear, moist mucous membrames, neck supple with full range of motion.    Neck: No thyroidmegaly. No jugular venous distension, no carotid bruits.  Cardiovascular: regular rate and rhythm, normal S1 and S2, no murmurs, rubs or gallops, peripheral pulses full and symmetric   Respiratory: clear to auscultation, no wheezes or crackles, normal breath sounds.    Gastrointestinal: positive bowel sounds, nontender, no hepatosplenomegaly, no masses. No guarding or rebound.  Genitourinary: External genitalia is normal appearance, except for 2-3mm mobile nontender very superficial mass just inside introitus/perineum, consistent with cyst. No enlargement of the Bartholin or Jellico glands. Urethral meatus normal. No visible discharge.    Lymphatic: no lymphadenopathy.  Musculoskeletal: full range of motion, no edema and motor strength is equal in the upper and lower extremities    Skin: no concerning lesions, no jaundice.  Neurological: cranial nerves intact, normal strength and sensation, reflexes at patella and biceps normal, normal gait, no tremor.   Monofilament Foot Exam: n/a    A/P  1. HCM--due for mammogram, otherwise up to date on cancer screening, immunizations and CV risk factor screening  2. Vertebral wedging on CXR--given perimenopausal/postmenopausal status, will order DEXA  3. Palpitations--check TSH  4. Vulvar cyst--discussed finding with patient, reassurance given, to return if tender, growing.      F/u 1 year        Again, thank you for allowing me to participate in the care of your patient.  "     Sincerely,    Yo West MD

## 2018-09-20 DIAGNOSIS — R00.2 PALPITATIONS: ICD-10-CM

## 2018-09-20 LAB — TSH SERPL DL<=0.005 MIU/L-ACNC: 2.82 MU/L (ref 0.4–4)

## 2018-09-28 ENCOUNTER — RADIANT APPOINTMENT (OUTPATIENT)
Dept: BONE DENSITY | Facility: CLINIC | Age: 53
End: 2018-09-28
Attending: FAMILY MEDICINE
Payer: COMMERCIAL

## 2018-09-28 ENCOUNTER — RADIANT APPOINTMENT (OUTPATIENT)
Dept: MAMMOGRAPHY | Facility: CLINIC | Age: 53
End: 2018-09-28
Attending: FAMILY MEDICINE
Payer: COMMERCIAL

## 2018-09-28 DIAGNOSIS — Z13.820 SCREENING FOR OSTEOPOROSIS: ICD-10-CM

## 2018-09-28 DIAGNOSIS — Z12.39 SCREENING FOR BREAST CANCER: ICD-10-CM

## 2018-10-01 NOTE — PROGRESS NOTES
Dear Shi,   Here are your recent results which are within the expected range. Please continue with your current plan of care.       Yo West MD

## 2018-10-16 NOTE — PROGRESS NOTES
Dear Shi,   Here are your recent results. Your bone density is low for your age, especially at the left hip,  but no evidence of osteoporosis.Your lumbar spine seems normal. Continue with weight bearing activity regularly and your vitamin D supplement. We should repeat this in 2 years     Yo West MD

## 2019-02-27 ENCOUNTER — DOCUMENTATION ONLY (OUTPATIENT)
Dept: CARE COORDINATION | Facility: CLINIC | Age: 54
End: 2019-02-27

## 2019-03-14 ENCOUNTER — OFFICE VISIT (OUTPATIENT)
Dept: DERMATOLOGY | Facility: CLINIC | Age: 54
End: 2019-03-14
Payer: COMMERCIAL

## 2019-03-14 DIAGNOSIS — D22.9 NEVUS, HALO: ICD-10-CM

## 2019-03-14 DIAGNOSIS — D48.5 NEOPLASM OF UNCERTAIN BEHAVIOR OF SKIN: Primary | ICD-10-CM

## 2019-03-14 RX ORDER — MULTIVIT-MIN/IRON/FOLIC ACID/K 18-600-40
CAPSULE ORAL
COMMUNITY

## 2019-03-14 RX ORDER — LIDOCAINE HYDROCHLORIDE AND EPINEPHRINE 10; 10 MG/ML; UG/ML
3 INJECTION, SOLUTION INFILTRATION; PERINEURAL ONCE
Status: ACTIVE | OUTPATIENT
Start: 2019-03-14

## 2019-03-14 ASSESSMENT — PAIN SCALES - GENERAL: PAINLEVEL: NO PAIN (0)

## 2019-03-14 NOTE — PROGRESS NOTES
CHIEF COMPLAINT:  Followup skin check.      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  She was last seen in our clinic on 12/14/2017, at which time we did not identify any concerning lesions.  Today she reports that she has one new spot on her right upper shoulder, which has been itchy and irritated for the past several months.  It has not bled spontaneously, and she does not think it is getting significantly bigger.  Otherwise, she denies any localized itch, pain or bleeding and has no other new or changing moles.      REVIEW OF SYSTEMS:  No recent fevers or chills.  No nonhealing oral sores.      PHYSICAL EXAMINATION:   GENERAL:  This is a well-appearing, well-nourished female with a normal mood and affect who is oriented x3.   SKIN:  A cutaneous exam of the head, neck, chest, abdomen, back, bilateral upper and lower extremities and axillae was performed.  On the back, there are rare, oval, white, flat-topped papules consistent with scars from prior biopsies.  No recurrent pigment is present in these areas.  On the right shoulder above the clavicle, there is an oblong, 5 x 3 mm, pink to tan, flat-topped papule with asymmetric pigment, including a dark-brown, reticulated surface pigment network at its inferior pole.  On the abdomen, there is a 2 mm, domed, flesh-toned papule on the right upper quadrant consistent with a benign nevus.  The rest of her skin check demonstrates scattered, benign-appearing solar lentigines and no other atypical moles or areas worrisome for nonmelanoma skin cancer.      ASSESSMENT AND PLAN:     1.  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  The differential diagnosis includes an atypical nevus or an inflamed benign keratosis.  A shave biopsy was performed today.  Please see the procedure note below.   2.  History of halo nevi.   Clinically, there is no evidence of recurrence today.   3.  Scattered, benign-appearing nevi and solar lentigines.  Reassurance was provided as to the benign nature of these lesions.   4.  She will follow up in our clinic in 1-2 years' time, sooner pending the results of her biopsy.      PROCEDURE NOTE:  After signed informed consent, the affected area was swabbed with an alcohol pad and injected with 1% lidocaine with epinephrine.  A biopsy via shave technique was taken and sent for histopathology.  Hemostasis was achieved with aluminum chloride 20%, and the defect was covered with petrolatum and a bandage.  The patient tolerated this without complication.       Yosi Jeronimo MD  Dermatology Attending

## 2019-03-14 NOTE — LETTER
RE: Shi Baker  [address]  [address]  [facility] [address]     Dear Colleague,    Thank you for referring your patient, Shi Baker, to the [facility] at [facility]. Please see a copy of my visit note below.    CHIEF COMPLAINT:  Followup skin check.      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  She was last seen in our clinic on 12/14/2017, at which time we did not identify any concerning lesions.  Today she reports that she has one new spot on her right upper shoulder, which has been itchy and irritated for the past several months.  It has not bled spontaneously, and she does not think it is getting significantly bigger.  Otherwise, she denies any localized itch, pain or bleeding and has no other new or changing moles.      REVIEW OF SYSTEMS:  No recent fevers or chills.  No nonhealing oral sores.      PHYSICAL EXAMINATION:   GENERAL:  This is a well-appearing, well-nourished female with a normal mood and affect who is oriented x3.   SKIN:  A cutaneous exam of the head, neck, chest, abdomen, back, bilateral upper and lower extremities and axillae was performed.  On the back, there are rare, oval, white, flat-topped papules consistent with scars from prior biopsies.  No recurrent pigment is present in these areas.  On the right shoulder above the clavicle, there is an oblong, 5 x 3 mm, pink to tan, flat-topped papule with asymmetric pigment, including a dark-brown, reticulated surface pigment network at its inferior pole.  On the abdomen, there is a 2 mm, domed, flesh-toned papule on the right upper quadrant consistent with a benign nevus.  The rest of her skin check demonstrates scattered, benign-appearing solar lentigines and no other atypical moles or areas worrisome for nonmelanoma skin cancer.       ASSESSMENT AND PLAN:     1.  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  The differential diagnosis includes an atypical nevus or an inflamed benign keratosis.  A shave biopsy was performed today.  Please see the procedure note below.   2.  History of halo nevi.  Clinically, there is no evidence of recurrence today.   3.  Scattered, benign-appearing nevi and solar lentigines.  Reassurance was provided as to the benign nature of these lesions.   4.  She will follow up in our clinic in 1-2 years' time, sooner pending the results of her biopsy.      PROCEDURE NOTE:  After signed informed consent, the affected area was swabbed with an alcohol pad and injected with 1% lidocaine with epinephrine.  A biopsy via shave technique was taken and sent for histopathology.  Hemostasis was achieved with aluminum chloride 20%, and the defect was covered with petrolatum and a bandage.  The patient tolerated this without complication.     Again, thank you for allowing me to participate in the care of your patient.      Sincerely,    Yosi Jeronimo MD

## 2019-03-14 NOTE — NURSING NOTE
Dermatology Rooming Note    Shi Baker's goals for this visit include:   Chief Complaint   Patient presents with     Skin Check     Skin check, Shi notes a lesion of conern on her collarbone.     Jana Schmidt LPN

## 2019-03-14 NOTE — PATIENT INSTRUCTIONS

## 2019-03-17 PROBLEM — D22.9 NEVUS, HALO: Status: ACTIVE | Noted: 2019-03-17

## 2019-03-17 PROBLEM — D48.5 NEOPLASM OF UNCERTAIN BEHAVIOR OF SKIN: Status: ACTIVE | Noted: 2019-03-17

## 2019-03-20 LAB — COPATH REPORT: NORMAL

## 2019-07-01 ENCOUNTER — OFFICE VISIT (OUTPATIENT)
Dept: FAMILY MEDICINE | Facility: CLINIC | Age: 54
End: 2019-07-01
Payer: COMMERCIAL

## 2019-07-01 VITALS
TEMPERATURE: 98.1 F | SYSTOLIC BLOOD PRESSURE: 124 MMHG | DIASTOLIC BLOOD PRESSURE: 82 MMHG | OXYGEN SATURATION: 100 % | BODY MASS INDEX: 20.88 KG/M2 | HEART RATE: 74 BPM | WEIGHT: 133 LBS | HEIGHT: 67 IN

## 2019-07-01 DIAGNOSIS — R19.7 DIARRHEA, UNSPECIFIED TYPE: Primary | ICD-10-CM

## 2019-07-01 LAB
ALBUMIN SERPL-MCNC: 4.2 G/DL (ref 3.4–5)
ALP SERPL-CCNC: 67 U/L (ref 40–150)
ALT SERPL W P-5'-P-CCNC: 35 U/L (ref 0–50)
ANION GAP SERPL CALCULATED.3IONS-SCNC: 4 MMOL/L (ref 3–14)
AST SERPL W P-5'-P-CCNC: 34 U/L (ref 0–45)
BASOPHILS # BLD AUTO: 0 10E9/L (ref 0–0.2)
BASOPHILS NFR BLD AUTO: 1.1 %
BILIRUB SERPL-MCNC: 0.4 MG/DL (ref 0.2–1.3)
BUN SERPL-MCNC: 10 MG/DL (ref 7–30)
CALCIUM SERPL-MCNC: 9.1 MG/DL (ref 8.5–10.1)
CHLORIDE SERPL-SCNC: 106 MMOL/L (ref 94–109)
CO2 SERPL-SCNC: 29 MMOL/L (ref 20–32)
CREAT SERPL-MCNC: 0.69 MG/DL (ref 0.52–1.04)
DIFFERENTIAL METHOD BLD: ABNORMAL
EOSINOPHIL # BLD AUTO: 0.1 10E9/L (ref 0–0.7)
EOSINOPHIL NFR BLD AUTO: 3.8 %
ERYTHROCYTE [DISTWIDTH] IN BLOOD BY AUTOMATED COUNT: 12.2 % (ref 10–15)
GFR SERPL CREATININE-BSD FRML MDRD: >90 ML/MIN/{1.73_M2}
GLUCOSE SERPL-MCNC: 97 MG/DL (ref 70–99)
HCT VFR BLD AUTO: 42.6 % (ref 35–47)
HGB BLD-MCNC: 13.4 G/DL (ref 11.7–15.7)
IMM GRANULOCYTES # BLD: 0 10E9/L (ref 0–0.4)
IMM GRANULOCYTES NFR BLD: 0.3 %
LYMPHOCYTES # BLD AUTO: 1.1 10E9/L (ref 0.8–5.3)
LYMPHOCYTES NFR BLD AUTO: 29 %
MCH RBC QN AUTO: 29.8 PG (ref 26.5–33)
MCHC RBC AUTO-ENTMCNC: 31.5 G/DL (ref 31.5–36.5)
MCV RBC AUTO: 95 FL (ref 78–100)
MONOCYTES # BLD AUTO: 0.3 10E9/L (ref 0–1.3)
MONOCYTES NFR BLD AUTO: 8.1 %
NEUTROPHILS # BLD AUTO: 2.1 10E9/L (ref 1.6–8.3)
NEUTROPHILS NFR BLD AUTO: 57.7 %
NRBC # BLD AUTO: 0 10*3/UL
NRBC BLD AUTO-RTO: 0 /100
PLATELET # BLD AUTO: 124 10E9/L (ref 150–450)
POTASSIUM SERPL-SCNC: 3.6 MMOL/L (ref 3.4–5.3)
PROT SERPL-MCNC: 7.7 G/DL (ref 6.8–8.8)
RBC # BLD AUTO: 4.5 10E12/L (ref 3.8–5.2)
SODIUM SERPL-SCNC: 138 MMOL/L (ref 133–144)
WBC # BLD AUTO: 3.7 10E9/L (ref 4–11)

## 2019-07-01 ASSESSMENT — PATIENT HEALTH QUESTIONNAIRE - PHQ9
SUM OF ALL RESPONSES TO PHQ QUESTIONS 1-9: 0
5. POOR APPETITE OR OVEREATING: NOT AT ALL

## 2019-07-01 ASSESSMENT — ANXIETY QUESTIONNAIRES

## 2019-07-01 ASSESSMENT — MIFFLIN-ST. JEOR: SCORE: 1236.14

## 2019-07-01 NOTE — NURSING NOTE
"53 year old  Chief Complaint   Patient presents with     Diarrhea     Fatigue, nausea, when they dont eat, they don't feel as nauseus, trying to drink water       Blood pressure 124/82, pulse 74, temperature 98.1  F (36.7  C), temperature source Oral, height 1.694 m (5' 6.7\"), weight 60.3 kg (133 lb), SpO2 100 %. Body mass index is 21.02 kg/m .  BP completed using cuff size:    Patient Active Problem List   Diagnosis     Irritable bowel syndrome with constipation     History of colonic polyps     Melanocytic nevus of trunk     Vitamin D deficiency     Xerosis of skin     Seborrheic keratosis     Multiple melanocytic nevi     Neoplasm of uncertain behavior of skin     Nevus, halo       Wt Readings from Last 2 Encounters:   07/01/19 60.3 kg (133 lb)   09/19/18 63.1 kg (139 lb 1.6 oz)     BP Readings from Last 3 Encounters:   07/01/19 124/82   09/19/18 120/73   09/08/17 118/81       No Known Allergies    Current Outpatient Medications   Medication     Vitamin D, Cholecalciferol, 1000 units TABS     vitamin D (ERGOCALCIFEROL) 33649 UNIT capsule     Current Facility-Administered Medications   Medication     lidocaine 1% with EPINEPHrine 1:100,000 injection 3 mL       Social History     Tobacco Use     Smoking status: Never Smoker     Smokeless tobacco: Never Used   Substance Use Topics     Alcohol use: Yes     Alcohol/week: 0.0 oz     Comment: Occasional     Drug use: No       Honoring Choices - Health Care Directive Guide offered to patient at time of visit.    Health Maintenance Due   Topic Date Due     HEPATITIS C SCREENING  1965     PAP  1965     HIV SCREENING  10/31/1980     LIPID  10/31/2010     ZOSTER IMMUNIZATION (1 of 2) 10/31/2015     PHQ-2  01/01/2019       Immunization History   Administered Date(s) Administered     HEPA 08/10/2007, 11/14/2008     HPV 12/08/2008, 06/18/2009     HepB 08/05/2016, 09/09/2016, 02/24/2017     Influenza Vaccine IM 3yrs+ 4 Valent IIV4 10/08/2014     MMR 12/23/1966, " 03/18/1976, 01/01/1979     OPV, trivalent, live 1965, 02/21/1966, 04/22/1971, 01/01/1979, 08/10/2007     Small Pox 11/28/1966, 04/22/1971     TD (ADULT, 7+) 01/01/1979, 04/07/2005     Tdap (Adacel,Boostrix) 08/20/2012     Typhoid IM 08/10/2007     Yellow Fever 08/10/2007       No results found for: PAP      Recent Labs   Lab Test 09/20/18  0638 04/08/16  1638   TSH 2.82 2.48       PHQ-2 ( 1999 Pfizer) 9/14/2018 8/28/2017   Q1: Little interest or pleasure in doing things 0 0   Q2: Feeling down, depressed or hopeless 0 0   PHQ-2 Score 0 0   Q1: Little interest or pleasure in doing things Not at all Not at all   Q2: Feeling down, depressed or hopeless Not at all Not at all   PHQ-2 Score 0 0       PHQ-9 SCORE 10/30/2015 7/1/2019   PHQ-9 Total Score 0 0       TED-7 SCORE 8/28/2017 9/14/2018 7/1/2019   Total Score 2 (minimal anxiety) 2 (minimal anxiety) -   Total Score 2 2 0       No flowsheet data found.      Merle Gauthier CMA  July 1, 2019 3:22 PM

## 2019-07-01 NOTE — PATIENT INSTRUCTIONS
Patient Education   1) Imodium - Acute diarrhea: Oral: Initial: 4 mg, followed by 2 mg after each loose stool (maximum: 16 mg/day).  2) As your lab results come back I will release them to [facility]. I will call you with any abnormal lab results that require follow-up.   3) If diarrhea is persisting > 14 days, please return the stool samples to clinic.   Diarrhea with Uncertain Cause (Adult)    Diarrhea is when stools are loose and watery. This can be caused by:    Viral infections    Bacterial infections    Food poisoning    Parasites    Irritable bowel syndrome (IBS)    Inflammatory bowel diseases such as ulcerative colitis, Crohn's disease, and celiac disease    Food intolerance, such as to lactose, the sugar found in milk and milk products    Reaction to medicines like antibiotics, laxatives, cancer drugs, and antacids  Along with diarrhea, you may also have:    Abdominal pain and cramping    Nausea and vomiting    Loss of bowel control    Fever and chills    Bloody stools  In some cases, antibiotics may help to treat diarrhea. You may have a stool sample test. This is done to see what is causing your diarrhea, and if antibiotics will help treat it. The results of a stool sample test may take up to 2 days. The healthcare provider may not give you antibiotics until he or she has the stool test results.  Diarrhea can cause dehydration. This is the loss of too much water and other fluids from the body. When this occurs, body fluid must be replaced. This can be done with oral rehydration solutions. Oral rehydration solutions are available at drugstores and grocery stores without a prescription. Sports drinks are not the best choice if you are very dehydrated. They have too much sugar and not enough electrolytes.  Home care  Follow all instructions given by your healthcare provider. Rest at home for the next 24 hours, or until you feel better. Avoid caffeine, tobacco, and alcohol. These can make diarrhea, cramping, and  pain worse.  If taking medicines:    Over-the-counter nausea and diarrhea medicines are generally OK unless you experience fever or blood stool. Check with your doctor first in those circumstances.    You may use acetaminophen or NSAID medicines like ibuprofen or naproxen to reduce pain and fever. Don t use these if you have chronic liver or kidney disease, or ever had a stomach ulcer or gastrointestinal bleeding. Don't use NSAID medicines if you are already taking one for another condition (like arthritis) or are on daily aspirin therapy (such as for heart disease or after a stroke). Talk with your healthcare provider first.    If antibiotics were prescribed, be sure you take them until they are finished. Don t stop taking them even when you feel better. Antibiotics must be taken as a full course.  To prevent the spread of illness:    Remember that washing with soap and water and using alcohol-based  is the best way to prevent the spread of infection. Dry your hands with a single use towel (like a paper towel).    Clean the toilet after each use.    Wash your hands before eating.    Wash your hands before and after preparing food. Keep in mind that people with diarrhea or vomiting should not prepare food for others.    Wash your hands after using cutting boards, countertops, and knives that have been in contact with raw foods.    Wash and then peel fruits and vegetables.    Keep uncooked meats away from cooked and ready-to-eat foods.    Use a food thermometer when cooking. Cook poultry to at least 165 F (74 C). Cook ground meat (beef, veal, pork, lamb) to at least 160 F (71 C). Cook fresh beef, veal, lamb, and pork to at least 145 F (63 C).    Don t eat raw or undercooked eggs (poached or loreto side up), poultry, meat, or unpasteurized milk and juices.  Food and drinks  The main goal while treating vomiting or diarrhea is to prevent dehydration. This is done by taking small amounts of liquids  often.    Keep in mind that liquids are more important than food right now.    Drink only small amounts of liquids at a time.    Don t force yourself to eat, especially if you are having cramping, vomiting, or diarrhea. Don t eat large amounts at a time, even if you are hungry.    If you eat, avoid fatty, greasy, spicy, or fried foods.    Don t eat dairy foods or drink milk if you have diarrhea. These can make diarrhea worse.  During the first 24 hours you can try:    Oral rehydration solutions.  Sports drinks may be used if you are not too dehydrated and are otherwise healthy.    Soft drinks without caffeine    Ginger ale    Water (plain or flavored)    Decaf tea or coffee    Clear broth, consommé, or bouillon    Gelatin, popsicles, or frozen fruit juice bars  The second 24 hours, if you are feeling better, you can add:    Hot cereal, plain toast, bread, rolls, or crackers    Plain noodles, rice, mashed potatoes, chicken noodle soup, or rice soup    Unsweetened canned fruit (no pineapple)    Bananas  As you recover:    Limit fat intake to less than 15 grams per day. Don t eat margarine, butter, oils, mayonnaise, sauces, gravies, fried foods, peanut butter, meat, poultry, or fish.    Limit fiber. Don t eat raw or cooked vegetables, fresh fruits except bananas, or bran cereals.    Limit caffeine and chocolate.    Limit dairy.    Don t use spices or seasonings except salt.    Go back to your normal diet over time, as you feel better and your symptoms improve.    If the symptoms come back, go back to a simple diet or clear liquids.  Follow-up care  Follow up with your healthcare provider, or as advised. If a stool sample was taken or cultures were done, call the healthcare provider for the results as instructed.  Call 911  Call 911 if you have any of these symptoms:    Trouble breathing    Confusion    Extreme drowsiness or trouble walking    Loss of consciousness    Rapid heart rate    Chest pain    Stiff  neck    Seizure  When to seek medical advice  Call your healthcare provider right away if any of these occur:    Abdominal pain that gets worse    Constant lower right abdominal pain    Continued vomiting and inability to keep liquids down    Diarrhea more than 5 times a day    Blood in vomit or stool    Dark urine or no urine for 8 hours, dry mouth and tongue, tiredness, weakness, or dizziness    Drowsiness    New rash    You don t get better in 2 to 3 days    Fever of 100.4 F (38 C) or higher, or as directed by your healthcare provider  Date Last Reviewed: 6/1/2018 2000-2018 [employer]. [address] [place] [address]. All rights reserved. This information is not intended as a substitute for professional medical care. Always follow your healthcare professional's instructions.

## 2019-07-01 NOTE — PROGRESS NOTES
HPI       Shi Baker is a 53 year old female with a past medical history significant for Raynaud's disease, IBS, DVT, and abnormal pap smear who presents for     Chief Complaint   Patient presents with     Diarrhea     Fatigue, nausea, when they dont eat, they don't feel as nauseus, trying to drink water     Shi reports a 10 day history of diarrhea. Reports diarrhea started as watery and explosive, then transitioned to loose stools but has since returned to being watery and explosive. She has had <5 episodes of diarrhea in the past 24 hours. She denies blood in the stool, fevers, and abdominal pain.  Reports nausea without vomiting. Nausea is worse with eating but she also notes it to be prominent when stomach is empty. She reports fatigue. Has been trying to increase fluids but doesn't think she has been drinking enough water. Denies episodes of constipation. She denies known exposure to ill contacts. Denies recent use of antibiotics. Denies recent travel. Denies new medications. She has been avoiding coffee and alcohol and trying to eat a bland diet. She has found nothing to help her symptoms.     Patient had colonoscopy completed 12/29/15. Results revealed 4 mm polyp in ascending colon that was removed, internal hemorrhoids that were not bleeding, markedly torturous redundant colon throughout the entire colon. Remainder of exam was normal. It was thought that patient likely had IBS with predominant constipation, recommended regular bowel regimen +/- daily probiotic supplementation.     Wt Readings from Last 2 Encounters:   07/01/19 60.3 kg (133 lb)   09/19/18 63.1 kg (139 lb 1.6 oz)     Past Medical History:   Diagnosis Date     Abnormal Pap smear 2009    Past LEEP and colposcopy     DVT (deep venous thrombosis) (H) 2005    at time of bunionectomy, bilateral.     IBS (irritable bowel syndrome)      Raynaud's disease 2005     Past Surgical History:   Procedure Laterality Date     APPENDECTOMY  2002     ruptured     COLONOSCOPY  2016     EYE SURGERY  3/2017    PRK     ORTHOPEDIC SURGERY  Bunionectomy    2005? Deep vein thrombosis     Family History   Problem Relation Age of Onset     Ovarian Cancer Mother         neg genetic testing     Psoriasis Son         27 2015, difficult to treat     Psoriasis Son         21 2015, easier to treat     Unknown/Adopted No family hx of         colon cancer, polyps, AID     Skin Cancer No family hx of         no skin cancer     Social History     Socioeconomic History     Marital status: Single     Spouse name: Not on file     Number of children: Not on file     Years of education: Not on file     Highest education level: Not on file   Occupational History     Not on file   Social Needs     Financial resource strain: Not on file     Food insecurity:     Worry: Not on file     Inability: Not on file     Transportation needs:     Medical: Not on file     Non-medical: Not on file   Tobacco Use     Smoking status: Never Smoker     Smokeless tobacco: Never Used   Substance and Sexual Activity     Alcohol use: Yes     Alcohol/week: 0.0 oz     Comment: Occasional     Drug use: No     Sexual activity: Yes     Partners: Male     Birth control/protection: IUD     Comment: mirena   Lifestyle     Physical activity:     Days per week: Not on file     Minutes per session: Not on file     Stress: Not on file   Relationships     Social connections:     Talks on phone: Not on file     Gets together: Not on file     Attends Jewish service: Not on file     Active member of club or organization: Not on file     Attends meetings of clubs or organizations: Not on file     Relationship status: Not on file     Intimate partner violence:     Fear of current or ex partner: Not on file     Emotionally abused: Not on file     Physically abused: Not on file     Forced sexual activity: Not on file   Other Topics Concern     Parent/sibling w/ CABG, MI or angioplasty before 65F 55M? Not Asked   Social History  "Narrative     Not on file     Current Outpatient Medications   Medication     Vitamin D, Cholecalciferol, 1000 units TABS     vitamin D (ERGOCALCIFEROL) 82199 UNIT capsule     Current Facility-Administered Medications   Medication     lidocaine 1% with EPINEPHrine 1:100,000 injection 3 mL      No Known Allergies      Problem, Medication and Allergy Lists were reviewed and updated if needed..    Patient is a new patient to this clinic and so  I reviewed/updated the Past Medical History, the Family History and the Social History .         Review of Systems:   Review of Systems     ROS: 10 point ROS neg other than the symptoms noted above in the HPI.         Physical Exam:     Vitals:    07/01/19 1520   BP: 124/82   Pulse: 74   Temp: 98.1  F (36.7  C)   TempSrc: Oral   SpO2: 100%   Weight: 60.3 kg (133 lb)   Height: 1.694 m (5' 6.7\")     Body mass index is 21.02 kg/m .  Vitals were reviewed and were normal.     Physical Exam   Constitutional: She is oriented to person, place, and time. She appears well-developed and well-nourished. No distress.   Cardiovascular: Normal rate, regular rhythm and normal heart sounds. Exam reveals no gallop and no friction rub.   No murmur heard.  Pulmonary/Chest: Effort normal and breath sounds normal. No stridor. No respiratory distress. She has no wheezes. She has no rales.   Abdominal: Soft. Bowel sounds are normal. She exhibits no distension and no mass. There is tenderness in the left lower quadrant. There is no guarding.   Neurological: She is alert and oriented to person, place, and time.   Skin: Skin is warm and dry.   Psychiatric: She has a normal mood and affect. Her behavior is normal.       Results:     Laboratory testing pending:  Comprehensive metabolic panel  CBC with platelets differential  Ova and Parasite Exam Routine  Immunos occult blood  Giardia antigen  Enteric bacteria and virus panel by [facility] Stool  Clostridium difficile toxin B PCR    Assessment and Plan      1. " Diarrhea, unspecified type  Comment: Pt with 10 day hx of diarrhea without blood in the stool or fever. She has had <5 episodes of diarrhea in the past 24 hours. Denies abdominal pain, admits to nausea without vomiting. Also reports fatigue, suspected to be secondary to dehydration. Pt has been trying to increase fluids but feels she could be doing a better job. Has not been replacing electrolytes, has not tried anti-diarrhea medication. Vital signs are stable today and patient does not appear to be in any acute distress. Physical exam is unremarkable. Will check electrolytes and CBC with diff. If diarrhea persists > 2 weeks, recommend patient return stool samples as ordered. Pt is agreeable to this plan. Of note, pt had colonoscopy completed 12/29/15. Results revealed 4 mm polyp in ascending colon that was removed, internal hemorrhoids that were not bleeding, markedly torturous redundant colon throughout the entire colon. Remainder of exam was normal. It was thought that patient likely had IBS with predominant constipation, recommended regular bowel regimen +/- daily probiotic supplementation.   Plan:   - Comprehensive metabolic panel   - CBC with platelets differential   - Clostridium difficile toxin B PCR; Future   - Ova and Parasite Exam Routine; Future   - Enteric Bacteria and Virus Panel by [facility] Stool; Future   - Giardia antigen; Future   - Immunos occult blood; Future   - Counseled on importance of restoring hydration through increased fluids, consider sports beverages mixed 50/50 with water, may also try Pedialyte   - Counseled on reasons to return to clinic or seek emergency care    Follow-up: Lab results pending, will follow-up as indicated. Return to clinic if symptoms fail to improve, worsen, or new symptoms develop with the above treatment plan.      There are no discontinued medications.    Options for treatment and follow-up care were reviewed with the patient. Shi Baker  engaged in the decision  making process and verbalized understanding of the options discussed and agreed with the final plan.    LAURE Shaffer CNP

## 2019-07-02 ENCOUNTER — TELEPHONE (OUTPATIENT)
Dept: FAMILY MEDICINE | Facility: CLINIC | Age: 54
End: 2019-07-02

## 2019-07-02 DIAGNOSIS — R19.7 DIARRHEA, UNSPECIFIED TYPE: ICD-10-CM

## 2019-07-02 LAB
C COLI+JEJUNI+LARI FUSA STL QL NAA+PROBE: NOT DETECTED
C DIFF TOX B STL QL: NEGATIVE
EC STX1 GENE STL QL NAA+PROBE: NOT DETECTED
EC STX2 GENE STL QL NAA+PROBE: NOT DETECTED
ENTERIC PATHOGEN COMMENT: NORMAL
HEMOCCULT STL QL IA: NEGATIVE
NOROV GI+II ORF1-ORF2 JNC STL QL NAA+PR: NOT DETECTED
RVA NSP5 STL QL NAA+PROBE: NOT DETECTED
SALMONELLA SP RPOD STL QL NAA+PROBE: NOT DETECTED
SHIGELLA SP+EIEC IPAH STL QL NAA+PROBE: NOT DETECTED
SPECIMEN SOURCE: NORMAL
V CHOL+PARA RFBL+TRKH+TNAA STL QL NAA+PR: NOT DETECTED
Y ENTERO RECN STL QL NAA+PROBE: NOT DETECTED

## 2019-07-02 ASSESSMENT — ANXIETY QUESTIONNAIRES: GAD7 TOTAL SCORE: 0

## 2019-07-02 NOTE — TELEPHONE ENCOUNTER
Patient calling with update on symptoms. Reports 6 episodes of diarrhea since this morning. She has tried taking Imodium twice, doesn't seem to have an effect on her symptoms. Reports stools now appear to contain undigested food. No fever or blood in stool. Recommend patient return stool samples as instructed yesterday. Offered GI referral but patient prefers to await results of stool cultures. Reinforced importance of maintaining adequate hydration and reasons to present to ED. Pt verbalizes understanding. No further action is required at this time.     Nydia Baig, DNP, APRN, CNP

## 2019-07-03 LAB
G LAMBLIA AG STL QL IA: NORMAL
O+P STL MICRO: NORMAL
O+P STL MICRO: NORMAL
SPECIMEN SOURCE: NORMAL
SPECIMEN SOURCE: NORMAL

## 2019-09-09 ASSESSMENT — ENCOUNTER SYMPTOMS
DIARRHEA: 1
CONSTIPATION: 1
NAUSEA: 1

## 2019-09-09 ASSESSMENT — ANXIETY QUESTIONNAIRES
6. BECOMING EASILY ANNOYED OR IRRITABLE: NOT AT ALL
7. FEELING AFRAID AS IF SOMETHING AWFUL MIGHT HAPPEN: NOT AT ALL
3. WORRYING TOO MUCH ABOUT DIFFERENT THINGS: NOT AT ALL
5. BEING SO RESTLESS THAT IT IS HARD TO SIT STILL: NOT AT ALL
GAD7 TOTAL SCORE: 0
2. NOT BEING ABLE TO STOP OR CONTROL WORRYING: NOT AT ALL
1. FEELING NERVOUS, ANXIOUS, OR ON EDGE: NOT AT ALL
4. TROUBLE RELAXING: NOT AT ALL
GAD7 TOTAL SCORE: 0
7. FEELING AFRAID AS IF SOMETHING AWFUL MIGHT HAPPEN: NOT AT ALL

## 2019-09-10 ASSESSMENT — ANXIETY QUESTIONNAIRES: GAD7 TOTAL SCORE: 0

## 2019-09-20 ENCOUNTER — OFFICE VISIT (OUTPATIENT)
Dept: FAMILY MEDICINE | Facility: CLINIC | Age: 54
End: 2019-09-20
Attending: FAMILY MEDICINE
Payer: COMMERCIAL

## 2019-09-20 VITALS
HEART RATE: 103 BPM | WEIGHT: 132.6 LBS | HEIGHT: 66 IN | DIASTOLIC BLOOD PRESSURE: 75 MMHG | SYSTOLIC BLOOD PRESSURE: 107 MMHG | BODY MASS INDEX: 21.31 KG/M2

## 2019-09-20 DIAGNOSIS — Z78.9 VEGAN: Primary | ICD-10-CM

## 2019-09-20 DIAGNOSIS — Z11.4 SCREENING FOR HIV (HUMAN IMMUNODEFICIENCY VIRUS): ICD-10-CM

## 2019-09-20 DIAGNOSIS — Z12.39 SCREENING FOR BREAST CANCER: ICD-10-CM

## 2019-09-20 DIAGNOSIS — Z13.1 SCREENING FOR DIABETES MELLITUS: ICD-10-CM

## 2019-09-20 DIAGNOSIS — Z13.220 LIPID SCREENING: ICD-10-CM

## 2019-09-20 DIAGNOSIS — Z11.59 ENCOUNTER FOR HEPATITIS C SCREENING TEST FOR LOW RISK PATIENT: ICD-10-CM

## 2019-09-20 LAB
CHOLEST SERPL-MCNC: 217 MG/DL
GLUCOSE SERPL-MCNC: 79 MG/DL (ref 70–99)
HCV AB SERPL QL IA: NONREACTIVE
HDLC SERPL-MCNC: 91 MG/DL
HIV 1+2 AB+HIV1 P24 AG SERPL QL IA: NONREACTIVE
LDLC SERPL CALC-MCNC: 116 MG/DL
NONHDLC SERPL-MCNC: 126 MG/DL
TRIGL SERPL-MCNC: 50 MG/DL

## 2019-09-20 PROCEDURE — G0463 HOSPITAL OUTPT CLINIC VISIT: HCPCS | Mod: ZF

## 2019-09-20 PROCEDURE — 82306 VITAMIN D 25 HYDROXY: CPT | Performed by: FAMILY MEDICINE

## 2019-09-20 PROCEDURE — 36415 COLL VENOUS BLD VENIPUNCTURE: CPT | Performed by: FAMILY MEDICINE

## 2019-09-20 PROCEDURE — 80061 LIPID PANEL: CPT | Performed by: FAMILY MEDICINE

## 2019-09-20 PROCEDURE — 82947 ASSAY GLUCOSE BLOOD QUANT: CPT | Performed by: FAMILY MEDICINE

## 2019-09-20 PROCEDURE — 86803 HEPATITIS C AB TEST: CPT | Performed by: FAMILY MEDICINE

## 2019-09-20 PROCEDURE — 87389 HIV-1 AG W/HIV-1&-2 AB AG IA: CPT | Performed by: FAMILY MEDICINE

## 2019-09-20 ASSESSMENT — ANXIETY QUESTIONNAIRES
6. BECOMING EASILY ANNOYED OR IRRITABLE: NOT AT ALL
7. FEELING AFRAID AS IF SOMETHING AWFUL MIGHT HAPPEN: NOT AT ALL
GAD7 TOTAL SCORE: 0
2. NOT BEING ABLE TO STOP OR CONTROL WORRYING: NOT AT ALL
1. FEELING NERVOUS, ANXIOUS, OR ON EDGE: NOT AT ALL
IF YOU CHECKED OFF ANY PROBLEMS ON THIS QUESTIONNAIRE, HOW DIFFICULT HAVE THESE PROBLEMS MADE IT FOR YOU TO DO YOUR WORK, TAKE CARE OF THINGS AT HOME, OR GET ALONG WITH OTHER PEOPLE: NOT DIFFICULT AT ALL
3. WORRYING TOO MUCH ABOUT DIFFERENT THINGS: NOT AT ALL
5. BEING SO RESTLESS THAT IT IS HARD TO SIT STILL: NOT AT ALL

## 2019-09-20 ASSESSMENT — PATIENT HEALTH QUESTIONNAIRE - PHQ9
SUM OF ALL RESPONSES TO PHQ QUESTIONS 1-9: 0
5. POOR APPETITE OR OVEREATING: NOT AT ALL

## 2019-09-20 ASSESSMENT — MIFFLIN-ST. JEOR: SCORE: 1223.22

## 2019-09-20 NOTE — PROGRESS NOTES
CC: Shi Bakre is a 50 year old female who presents for routine health maintenance exam.  LMP: No LMP recorded. Patient is not currently having periods (Reason: Birth Control).      HPI:   1. Diarrhea--had severe bout of diarrhea in , dehydrated, seen in ED. Multiple stool tests negative, improved slowly over 2 months, not quite back to usual pattern, has GI appointment upcoming.  2. Sprained ankle on , seen in urgent care, able to walk in it, X-ray negative.   3. IBS with constipation; doing well with cutting out dairy    3. Raynaud's-- Happens with cold weather, holding cold drink. Not in toes. No changes    4. Breast--no concerns. Due for mammogram,  all normal in past  5. Gyne-- Mirena IUD placed 2015, no menses. No problems, self checks strings  Few  hot flashes and night sweats, tolerable.    Had DVT in past, so stopped OCP, and menses were regular heavy, switched to Mirena.   Last Pap 2015, negative with negative HPV. Had LEEP with HEIKE 2-3,  was last abnormal.     HTN with 1st pregnancy. 7-8# babies.    No vaginal symptoms.  Urinary:   Some urinary urgency, but wake 2/night.   Not currently sexually active with partner. 10 partners liftime,  men only. No STI, HIV test over 20 years ago. HPV vaccinated, has traveled to [place] and [place]; Hep B vaccinated, Never Hep C tested  No sexual concerns, no hx abuse.   6. Low bone density --decreased dairy, taking Vit D 5000 units daily. Activity: walking/biking daily, at least 30 min. DEXA 2018 T score -1.7  7. HCM--dtaP , Discussed Shingrex;  lipids --due, glucose  Due;  Flu vaccine will do at work   colonscopy 2016, +polyp, return in 5 years.        PMH:  Ruptured appendix  DVT, below knee, not hospitalized                 Patient Active Problem List   Diagnosis     Irritable bowel syndrome with constipation     History of colonic polyps     Melanocytic nevus of trunk     Vitamin D deficiency           MEDS         Current Outpatient Prescriptions                Current Outpatient Prescriptions   Medication Sig Dispense Refill     vitamin D (ERGOCALCIFEROL) 83880 UNIT capsule Take one twice weekly for 12 weeks. Then lab test AND start Vitamin D 2 or Vitamin D 3 5000 units daily. Recheck after 3 to 6 months. 24 capsule 0           Family History   Problem Relation Age of Onset     Ovarian Cancer Mother         neg genetic testing     Psoriasis Son         27 2015, difficult to treat     Psoriasis Son         21 2015, easier to treat     Unknown/Adopted No family hx of         colon cancer, polyps, AID     Skin Cancer No family hx of         no skin cancer     Patient adopted, but met mother while mother in her 30's--thinks BRCA  Negative.    No Known Allergies    Social History:    Social History                 Social History     Marital Status: Single         Spouse Name: N/A     Number of Children: N/A     Years of Education: N/A             Occupational History     Not on file.                   Social History Main Topics     Smoking status: Never Smoker      Smokeless tobacco: Not on file     Alcohol Use: 0.0 oz/week         0 Standard drinks or equivalent per week            Comment: Occasional     Drug Use: No     Sexual Activity:           Partners: Male         Birth Control/ Protection: IUD            Comment: mirena               Other Topics Concern     Not on file       Social History Narrative      Vegetarian--close to vegan, occ. 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     ROS  Answers for HPI/ROS submitted by the patient on 9/9/2019   TED 7 TOTAL SCORE: 0  General Symptoms: No  Skin Symptoms: No  HENT Symptoms: No  EYE SYMPTOMS: No  HEART SYMPTOMS: No  LUNG SYMPTOMS: No  INTESTINAL SYMPTOMS: Yes  URINARY SYMPTOMS: No  GYNECOLOGIC SYMPTOMS: No  BREAST SYMPTOMS: No  SKELETAL SYMPTOMS: No  BLOOD SYMPTOMS: No  NERVOUS SYSTEM SYMPTOMS: No  MENTAL HEALTH SYMPTOMS: No  Nausea: Yes  Constipation: Yes  Diarrhea: Yes  Reviewed with patient    PE  Blood pressure 107/75, pulse 103, height 1.676 m (5' 6\"), weight 60.1 kg (132 lb 9.6 oz).  EXAM:  Constitutional: healthy, alert and no distress   Cardiovascular: negative, PMI normal. No lifts, heaves, or thrills. RRR. No murmurs, clicks gallops or rub, No edema or JVD.  Respiratory: negative, Percussion normal. Good diaphragmatic excursion. Lungs clear, negative findings: normal respiratory rate and rhythm, lungs clear to auscultation  Psychiatric: mentation appears normal and affect normal/bright  Neck: Neck supple. No adenopathy. Thyroid symmetric, normal size,, Carotids without bruits., negative findings: no adenopathy  ENT: ENT exam normal, no neck nodes or sinus tenderness, bilateral TM normal without fluid or infection and throat normal without erythema or exudate  Abdomen: Abdomen soft, non-tender. BS normal. No masses, organomegaly, negative findings: aorta normal  NEURO: Gait normal. Reflexes normal and symmetric. Sensation grossly WNL., negative findings: cranial nerves 2-12 intact, muscle strength normal, reflexes normal and symmetric  SKIN: no suspicious lesions or rashes  LYMPH: Normal cervical lymph nodes  JOINT/EXTREMITIES: extremities normal- no gross deformities noted, gait normal and normal muscle " tone    Recent labs reviewed: CBC normal 7/2019    A/P  1. HCM  Immunizations--  Up to date    Cancer screening--:  Mammogram   CV risk- Up to date    Need: Lipid panel Glucose  Bone Health-- Vitamin D, see below    STI/HIV Screening -- HIV  Hep C  Testing per guidelines    2. Perimenopause, Mirena IUD  Discussed with patient on decision making when to discontinue IUD and average age menopause; pt elect to keep IUD for total of 7 years use    3. Low bone density  Continue vitamin D supplement, weight bearing activity, repeat DEXA 2020    Does eye check, dentist yearly     Follow-up 1 year

## 2019-09-20 NOTE — LETTER
2019       RE: Shi Baker  [address]  [address]  [facility] [address]     Dear Colleague,    Thank you for referring your patient, Shi Baker, to the [facility] at [facility]. Please see a copy of my visit note below.    CC: Shi Baker is a 50 year old female who presents for routine health maintenance exam.  LMP: No LMP recorded. Patient is not currently having periods (Reason: Birth Control).      HPI:   1. Diarrhea--had severe bout of diarrhea in , dehydrated, seen in ED. Multiple stool tests negative, improved slowly over 2 months, not quite back to usual pattern, has GI appointment upcoming.  2. Sprained ankle on , seen in urgent care, able to walk in it, X-ray negative.   3. IBS with constipation; doing well with cutting out dairy    3. Raynaud's-- Happens with cold weather, holding cold drink. Not in toes. No changes    4. Breast--no concerns. Due for mammogram,  all normal in past  5. Gyne-- Mirena IUD placed 2015, no menses. No problems, self checks strings  Few  hot flashes and night sweats, tolerable.    Had DVT in past, so stopped OCP, and menses were regular heavy, switched to Mirena.   Last Pap 2015, negative with negative HPV. Had LEEP with HEIKE 2-3,  was last abnormal.     HTN with 1st pregnancy. 7-8# babies.    No vaginal symptoms.  Urinary:   Some urinary urgency, but wake 2/night.   Not currently sexually active with partner. 10 partners liftime,  men only. No STI, HIV test over 20 years ago. HPV vaccinated, has traveled to [place] and [place]; Hep B vaccinated, Never Hep C tested  No sexual concerns, no hx abuse.   6. Low bone density --decreased dairy, taking Vit D 5000 units daily. Activity: walking/biking daily, at least 30 min. DEXA 2018 T score -1.7  7. HCM--dtaP , Discussed Shingrex;  lipids --due, glucose  Due;  Flu vaccine will do at work   colonscopy 2016, +polyp, return in 5  years.        PMH:  Ruptured appendix  DVT, below knee, not hospitalized                 Patient Active Problem List   Diagnosis     Irritable bowel syndrome with constipation     History of colonic polyps     Melanocytic nevus of trunk     Vitamin D deficiency           MEDS        Current Outpatient Prescriptions                Current Outpatient Prescriptions   Medication Sig Dispense Refill     vitamin D (ERGOCALCIFEROL) 31803 UNIT capsule Take one twice weekly for 12 weeks. Then lab test AND start Vitamin D 2 or Vitamin D 3 5000 units daily. Recheck after 3 to 6 months. 24 capsule 0           Family History   Problem Relation Age of Onset     Ovarian Cancer Mother         neg genetic testing     Psoriasis Son         27 2015, difficult to treat     Psoriasis Son         21 2015, easier to treat     Unknown/Adopted No family hx of         colon cancer, polyps, AID     Skin Cancer No family hx of         no skin cancer     Patient adopted, but met mother while mother in her 30's--thinks BRCA  Negative.    No Known Allergies    Social History:    Social History                 Social History     Marital Status: Single         Spouse Name: N/A     Number of Children: N/A     Years of Education: N/A             Occupational History     Not on file.                   Social History Main Topics     Smoking status: Never Smoker      Smokeless tobacco: Not on file     Alcohol Use: 0.0 oz/week         0 Standard drinks or equivalent per week            Comment: Occasional     Drug Use: No     Sexual Activity:           Partners: Male         Birth Control/ Protection: IUD            Comment: mirena               Other Topics Concern     Not on file       Social History Narrative      Vegetarian--close to vegan, occ. 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     ROS  Answers for HPI/ROS submitted by the patient on 9/9/2019   TED 7 TOTAL SCORE: 0  General Symptoms: No  Skin Symptoms: No  HENT Symptoms: No  EYE SYMPTOMS: No  HEART SYMPTOMS: No  LUNG SYMPTOMS: No  INTESTINAL SYMPTOMS: Yes  URINARY SYMPTOMS: No  GYNECOLOGIC SYMPTOMS: No  BREAST SYMPTOMS: No  SKELETAL SYMPTOMS: No  BLOOD SYMPTOMS: No  NERVOUS SYSTEM SYMPTOMS: No  MENTAL HEALTH SYMPTOMS: No  Nausea: Yes  Constipation: Yes  Diarrhea: Yes  Reviewed with patient    PE  Blood pressure 107/75, pulse 103, height 1.676 m (5' 6\"), weight 60.1 kg (132 lb 9.6 oz).  EXAM:  Constitutional: healthy, alert and no distress   Cardiovascular: negative, PMI normal. No lifts, heaves, or thrills. RRR. No murmurs, clicks gallops or rub, No edema or JVD.  Respiratory: negative, Percussion normal. Good diaphragmatic excursion. Lungs clear, negative findings: normal respiratory rate and rhythm, lungs clear to auscultation  Psychiatric: mentation appears normal and affect normal/bright  Neck: Neck supple. No adenopathy. Thyroid symmetric, normal size,, Carotids without bruits., negative findings: no adenopathy  ENT: ENT exam normal, no neck nodes or sinus tenderness, bilateral TM normal without fluid or infection and throat normal without erythema or exudate  Abdomen: Abdomen soft, non-tender. BS normal. No masses, organomegaly, negative findings: aorta normal  NEURO: Gait normal. Reflexes normal and symmetric. Sensation " grossly WNL., negative findings: cranial nerves 2-12 intact, muscle strength normal, reflexes normal and symmetric  SKIN: no suspicious lesions or rashes  LYMPH: Normal cervical lymph nodes  JOINT/EXTREMITIES: extremities normal- no gross deformities noted, gait normal and normal muscle tone    Recent labs reviewed: CBC normal 7/2019    A/P  1. HCM  Immunizations--  Up to date    Cancer screening--:  Mammogram   CV risk- Up to date    Need: Lipid panel Glucose  Bone Health-- Vitamin D, see below    STI/HIV Screening -- HIV  Hep C  Testing per guidelines    2. Perimenopause, Mirena IUD  Discussed with patient on decision making when to discontinue IUD and average age menopause; pt elect to keep IUD for total of 7 years use    3. Low bone density  Continue vitamin D supplement, weight bearing activity, repeat DEXA 2020    Does eye check, dentist yearly     Follow-up 1 year    Yo West MD

## 2019-09-24 LAB
DEPRECATED CALCIDIOL+CALCIFEROL SERPL-MC: <54 UG/L (ref 20–75)
VITAMIN D2 SERPL-MCNC: <5 UG/L
VITAMIN D3 SERPL-MCNC: 49 UG/L

## 2019-09-25 NOTE — RESULT ENCOUNTER NOTE
Dear Shi,   Here are your recent results which are within the expected, normal  range. Please continue with your current plan of care.       Yo West MD

## 2019-09-26 NOTE — TELEPHONE ENCOUNTER
RECORDS RECEIVED FROM: Internal    DATE RECEIVED: 12/11/19    NOTES STATUS DETAILS   OFFICE NOTE from referring provider Internal Previous pt of Yoselyn Borne APRN CNP    OFFICE NOTE from other specialist N/A    DISCHARGE SUMMARY from hospital N/A    OPERATIVE REPORT N/A    MEDICATION LIST N/A         ENDOSCOPY  N/A    COLONOSCOPY Received 12/29/15    ERCP N/A    EUS N/A    STOOL TESTING N/A    PERTINENT LABS Internal    PATHOLOGY REPORTS (RELATED) Internal 12/29/15   IMAGING (CT, MRI, EGD) N/A      REFERRAL INFORMATION    Date referral was placed: 12/11/19   Date all records received: N/A   Date records were scanned into Epic: N/A   Date records were sent to Provider to review: N/A   Date and recommendation received from provider:  LETTER SENT  SCHEDULE APPOINTMENT   Date patient was contacted to schedule: 8/5/19

## 2019-10-02 ENCOUNTER — HEALTH MAINTENANCE LETTER (OUTPATIENT)
Age: 54
End: 2019-10-02

## 2019-10-18 ENCOUNTER — ANCILLARY PROCEDURE (OUTPATIENT)
Dept: MAMMOGRAPHY | Facility: CLINIC | Age: 54
End: 2019-10-18
Attending: FAMILY MEDICINE
Payer: COMMERCIAL

## 2019-10-18 DIAGNOSIS — Z12.31 VISIT FOR SCREENING MAMMOGRAM: ICD-10-CM

## 2019-10-30 NOTE — RESULT ENCOUNTER NOTE
Dear Shi,   Here are your recent results which are within the normal range. Please continue with your current plan of care.       Yo West MD

## 2019-11-27 ASSESSMENT — ENCOUNTER SYMPTOMS
SEIZURES: 0
HEARTBURN: 0
ABDOMINAL PAIN: 0
DISTURBANCES IN COORDINATION: 0
TREMORS: 0
WEAKNESS: 0
BOWEL INCONTINENCE: 0
VOMITING: 0
BLOOD IN STOOL: 0
NUMBNESS: 0
HEADACHES: 1
BLOATING: 0
NAUSEA: 1
JAUNDICE: 0
MEMORY LOSS: 0
SPEECH CHANGE: 0
DIARRHEA: 1
CONSTIPATION: 1
LOSS OF CONSCIOUSNESS: 0
TINGLING: 0
PARALYSIS: 0
RECTAL PAIN: 0
DIZZINESS: 0

## 2019-12-09 ENCOUNTER — TELEPHONE (OUTPATIENT)
Dept: GASTROENTEROLOGY | Facility: CLINIC | Age: 54
End: 2019-12-09

## 2019-12-09 NOTE — TELEPHONE ENCOUNTER
Spoke to patient reminding of appointment scheduled on 12/11/19 at 0820 with [facility]. Patient to arrive 15 min early. To reschedule or cancel patient to call [phone].    ARTHUR Panda

## 2019-12-11 ENCOUNTER — PRE VISIT (OUTPATIENT)
Dept: GASTROENTEROLOGY | Facility: CLINIC | Age: 54
End: 2019-12-11

## 2019-12-11 ENCOUNTER — OFFICE VISIT (OUTPATIENT)
Dept: GASTROENTEROLOGY | Facility: CLINIC | Age: 54
End: 2019-12-11
Payer: COMMERCIAL

## 2019-12-11 VITALS
HEIGHT: 66 IN | WEIGHT: 131.4 LBS | DIASTOLIC BLOOD PRESSURE: 78 MMHG | SYSTOLIC BLOOD PRESSURE: 117 MMHG | BODY MASS INDEX: 21.12 KG/M2 | HEART RATE: 76 BPM

## 2019-12-11 DIAGNOSIS — K59.00 CONSTIPATION, UNSPECIFIED CONSTIPATION TYPE: Primary | ICD-10-CM

## 2019-12-11 ASSESSMENT — MIFFLIN-ST. JEOR: SCORE: 1212.78

## 2019-12-11 ASSESSMENT — PAIN SCALES - GENERAL: PAINLEVEL: NO PAIN (0)

## 2019-12-11 NOTE — PATIENT INSTRUCTIONS
1. Start Miralax - 1 capful daily mixed in 8 oz of water. If stools are loose can cut down to 1 capful every other day or 1/2 capful daily.  2. Track your stools daily on a calendar.   3. Contact GI clinic in about 2 weeks with an update.  4. Continue with drinking 8 glasses of water.  5. Follow-up with LANEY Matos in 8-12 weeks.       If you have any questions, please don't hesitate to contact me through our GI RN Clinic Coordinator, Clare Freeman, at [phone].

## 2019-12-11 NOTE — LETTER
12/11/2019       RE: Shi Baker  [address]  [address]  [facility] [address]     Dear Colleague,    Thank you for referring your patient, Shi Baker, to the [facility] [place] [facility] at [facility]. Please see a copy of my visit note below.    GI CLINIC VISIT    CC/REFERRING MD:  Referred Self  REASON FOR CONSULTATION:   Ms. Baker is a 54 year old female who I was asked to see in consultation at the request of Dr. Referred Self for   Chief Complaint   Patient presents with     New Patient     new consult, nausea, diarrhea and fatigue       ASSESSMENT/PLAN:  (K59.00) Constipation, unspecified constipation type  (primary encounter diagnosis)  Comment:    Discussed that her symptoms this summer were likely representative of acute viral infection. We discussed that stool studies do not exist for all viruses known to cause symptoms and that timing of testing can matter for certain viruses. We discussed that it is not uncommon (up to 50%) of patients with viral gastroenteritis to have negative stool studies. Certainly time course would fit with this (within days to a week having complete resolution of nausea, fatigue, and all-through-the day and night diarrhea).     Thereafter, experienced once (less frequently twice) daily stool cluster with no loose stools in-between. 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 With time returned to baseline pattern.    Baseline pattern trending to constipation. Now (and for last few months) going 1-2 times a week (every 4 days or so). Symptoms of straining, hard stools, occasional rectal symptoms (fissures, possible hemorrhoids).  We discussed that at least 3 BMs a week (without symptoms) would be considered treatment goal.     Prior constipation work-up with normal TSH and TTG. Ca 9.1. Would treat with osmotic laxatives. Med discussed in detail and questions addressed. Extensive discussion on  "management, workup of constipation as well as IBS (post-infectious or otherwise). Given lack of significant discomfort/pain, wouldn't necessarily classify patient as IBS-C at this time. Will continue to monitor.   Plan:   1. Start Miralax - 1 capful daily mixed in 8 oz of water. If stools are loose can cut down to 1 capful every other day or 1/2 capful daily.  2. Track your stools daily on a calendar.   3. Contact GI clinic in about 2 weeks with an update.  4. Continue with drinking 8 glasses of water.  5. Follow-up with LANEY Matos in 8-12 weeks.       Thank you for this consultation.  It was a pleasure to participate in the care of this patient; please contact us with any further questions.  A total of 45 face-to-face minutes was spent with this patient, >50% of which was counseling regarding the above delineated issues.    Zoya Ackerman MD   of Medicine  Division of Gastroenterology, Hepatology and Nutrition  [facility]    ---------------------------------------------------------------------------------------------------  HPI:  Ms. Bakre is a 54 year old female with vitamin D deficiency, prior DVT (after surgery), s/p appy for ruptured appendix, constipation, and prior tubular adenoma on colonoscopy.     Ms. Baker was seen in our clinic over 3 years ago in our clinic by Yanet Franklin CNP. At that time she was found to have constipation and c/o issues with cramping and bloating as well as fatigue. She states that with lactose avoidance her her bloating and cramping completely resolved.  She tried treating her constipation with fiber but , ultimately, did not find it helpful and discontinued it's use.  She shares that she was treated with vitamin D as well which did improve her fatigue.      She reports that she is referred today after experiencing a few months of diarrhea this summer.  Ms. Baker states that in June 2018 she had \"severe\" diarrhea for which she " "missed work and had trouble leaving the house.  Her diarrhea was accompanied by severe nausea, though she experienced no vomiting. She had nocturnal stools and also pronounced fatigue.  She denied having any BRBPR.  She was seen in her primary care clinic where stool testing for C. difficile, Giardia, ova and parasites, and enteric panel were all negative.  She focused on rehydration and states that her fatigue and nausea resolved after a few days. Her nocturnal stooling also quickly resolved.     Unfortunately Ms. Baker reports that her loose stools persisted thereafter.  She states her primary care clinic recommended bland foods and loperamide which she tried early on without much success.  For the next 2 months she describes experiencing daytime loose stools.  Typically she would pass 2-4 clustered loose stools over an hour; occasionally she may have a day with a second cluster of loose stools.  Stools are Bollinger 5-7.  She expresses some concern about seeing vegetable material within her stools and notes that drinking alcohol made her loose stools much worse.    After 2 months she went on a trip and noted that her bowels slowed during this time.  Thereafter her bowels have returned to her prior baseline and she is now worried that she may not be moving her bowels enough.    Currently, she moves her bowels about once every 4 days. On the days she moves her bowels she will pass 4 bowel movements over the course of the morning. Now and generally feels \"empty\" thereafter.  Stools often start as Bollinger 1, then go to Bollinger 3-4. Ms. Baker denies any significant symptoms in between bowel movements and reports that she generally feels \"okay\".  She attributes the prior bloating and cramping noted in past documentation to lactose.  With defecation she does strain frequently and has at times noted extra tissue with wiping.  She has had rare spots of blood on the toilet paper in the past and reports that she has " been told she has anal fissures previously.  She does not know if she is ever had hemorrhoids.    She is not currently on any bowel medications.  She is a vegetarian and has been for many years, reporting high dietary intake fiber.  As stated above she does avoid lactose.  For previous constipation management she has tried supplemental fiber in the past without success.  She believes she had a short course of MiraLAX as well but does not remember how it went.    She underwent a colonoscopy in 2015 at the time she first began following in GI clinic.  Her initial colonoscopy could not be completed due to redundant colon, looping, and discomfort.  It was repeated under MAC sedation with success and one small tubular adenoma was resected.  She is due for repeat exam in 2020.      ROS:    Please see bottom of this note.     PROBLEM LIST  Patient Active Problem List    Diagnosis Date Noted     Neoplasm of uncertain behavior of skin 03/17/2019     Priority: Medium     Nevus, halo 03/17/2019     Priority: Medium     Xerosis of skin 12/17/2017     Priority: Medium     Seborrheic keratosis 12/17/2017     Priority: Medium     Multiple melanocytic nevi 12/17/2017     Priority: Medium     Vitamin D deficiency 04/13/2016     Priority: Medium     Melanocytic nevus of trunk 03/09/2016     Priority: Medium     Irritable bowel syndrome with constipation 01/06/2016     Priority: Medium     History of colonic polyps 01/06/2016     Priority: Medium     4 mm tubular adenoma ascending colon 12/2015. Next colonoscopy 2020 if not sooner for other purposes         PERTINENT PAST MEDICAL HISTORY:  Past Medical History:   Diagnosis Date     Abnormal Pap smear 2009    Past LEEP and colposcopy     DVT (deep venous thrombosis) (H) 2005    at time of bunionectomy, bilateral.     History of colonic polyps 2016    Removed at colonoscopy     IBS (irritable bowel syndrome)      Raynaud's disease 2005       PREVIOUS SURGERIES:  Past Surgical History:    Procedure Laterality Date     APPENDECTOMY  2002    ruptured     COLONOSCOPY  2016     EYE SURGERY  3/2017    PRK     ORTHOPEDIC SURGERY  Bunionectomy    2005? Deep vein thrombosis     ALLERGIES:   No Known Allergies    PERTINENT MEDICATIONS:  Current Outpatient Medications   Medication     Vitamin D, Cholecalciferol, 1000 units TABS     Current Facility-Administered Medications   Medication     lidocaine 1% with EPINEPHrine 1:100,000 injection 3 mL       SOCIAL HISTORY:  Social History     Socioeconomic History     Marital status: Single     Spouse name: Not on file     Number of children: Not on file     Years of education: Not on file     Highest education level: Not on file   Occupational History     Not on file   Social Needs     Financial resource strain: Not on file     Food insecurity:     Worry: Not on file     Inability: Not on file     Transportation needs:     Medical: Not on file     Non-medical: Not on file   Tobacco Use     Smoking status: Never Smoker     Smokeless tobacco: Never Used   Substance and Sexual Activity     Alcohol use: Yes     Alcohol/week: 0.0 standard drinks     Comment: Occasional     Drug use: No     Sexual activity: Yes     Partners: Male     Birth control/protection: I.U.D.     Comment: mirena   Lifestyle     Physical activity:     Days per week: Not on file     Minutes per session: Not on file     Stress: Not on file   Relationships     Social connections:     Talks on phone: Not on file     Gets together: Not on file     Attends Hinduism service: Not on file     Active member of club or organization: Not on file     Attends meetings of clubs or organizations: Not on file     Relationship status: Not on file     Intimate partner violence:     Fear of current or ex partner: Not on file     Emotionally abused: Not on file     Physically abused: Not on file     Forced sexual activity: Not on file   Other Topics Concern     Parent/sibling w/ CABG, MI or angioplasty before 65F 55M?  "Not Asked   Social History Narrative     Not on file       FAMILY HISTORY:  Family History   Problem Relation Age of Onset     Ovarian Cancer Mother         neg genetic testing     Psoriasis Son         27 2015, difficult to treat     Psoriasis Son         21 2015, easier to treat     Unknown/Adopted No family hx of         colon cancer, polyps, AID     Skin Cancer No family hx of         no skin cancer       Past/family/social history reviewed and no changes    PHYSICAL EXAMINATION:  Vitals /78   Pulse 76   Ht 1.676 m (5' 6\")   Wt 59.6 kg (131 lb 6.4 oz)   BMI 21.21 kg/m      Wt   Wt Readings from Last 2 Encounters:   12/11/19 59.6 kg (131 lb 6.4 oz)   09/20/19 60.1 kg (132 lb 9.6 oz)   Gen: Pt sitting up in NAD, interactive and cooperative on exam  Eyes: sclerae anicteric, no injection  ENT:  OP clear, MMM  Cardiac: RRR, nl S1, S2 no peripheral edema  Resp: Clear on anterior exam  GI: Normoactive BS, abd soft, flat, nontender  Skin: Warm, dry, no jaundice, nails appear healthy  Lymph: no submandibular, no cervical, and no supraclavicular LAD  Neuro: alert, oriented, answers questions appropriately      PERTINENT STUDIES:    Office Visit on 09/20/2019   Component Date Value Ref Range Status     25 OH Vit D2 09/20/2019 <5  ug/L Final     25 OH Vit D3 09/20/2019 49  ug/L Final     25 OH Vit D total 09/20/2019 <54  20 - 75 ug/L Final     Hepatitis C Antibody 09/20/2019 Nonreactive  NR^Nonreactive Final     HIV Antigen Antibody Combo 09/20/2019 Nonreactive  NR^Nonreactive     Final     Glucose 09/20/2019 79  70 - 99 mg/dL Final     Cholesterol 09/20/2019 217* <200 mg/dL Final     Triglycerides 09/20/2019 50  <150 mg/dL Final     HDL Cholesterol 09/20/2019 91  >49 mg/dL Final     LDL Cholesterol Calculated 09/20/2019 116* <100 mg/dL Final     Non HDL Cholesterol 09/20/2019 126  <130 mg/dL Final       Again, thank you for allowing me to participate in the care of your patient.      Sincerely,    Zoya " Lucia Ackerman MD

## 2019-12-11 NOTE — NURSING NOTE
"Chief Complaint   Patient presents with     New Patient     new consult, nausea, diarrhea and fatigue       Vitals:    12/11/19 0801   BP: 117/78   Pulse: 76   Weight: 59.6 kg (131 lb 6.4 oz)   Height: 1.676 m (5' 6\")       Body mass index is 21.21 kg/m .    Genny Reed CMA    "

## 2019-12-11 NOTE — PROGRESS NOTES
GI CLINIC VISIT    CC/REFERRING MD:  Referred Self  REASON FOR CONSULTATION:   Ms. Baker is a 54 year old female who I was asked to see in consultation at the request of  Referred Self for   Chief Complaint   Patient presents with     New Patient     new consult, nausea, diarrhea and fatigue       ASSESSMENT/PLAN:  (K59.00) Constipation, unspecified constipation type  (primary encounter diagnosis)  Comment:    Discussed that her symptoms this summer were likely representative of acute viral infection. We discussed that stool studies do not exist for all viruses known to cause symptoms and that timing of testing can matter for certain viruses. We discussed that it is not uncommon (up to 50%) of patients with viral gastroenteritis to have negative stool studies. Certainly time course would fit with this (within days to a week having complete resolution of nausea, fatigue, and all-through-the day and night diarrhea).     Thereafter, experienced once (less frequently twice) daily stool cluster with no loose stools in-between. 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 With time returned to baseline pattern.    Baseline pattern trending to constipation. Now (and for last few months) going 1-2 times a week (every 4 days or so). Symptoms of straining, hard stools, occasional rectal symptoms (fissures, possible hemorrhoids).  We discussed that at least 3 BMs a week (without symptoms) would be considered treatment goal.     Prior constipation work-up with normal TSH and TTG. Ca 9.1. Would treat with osmotic laxatives. Med discussed in detail and questions addressed. Extensive discussion on management, workup of constipation as well as IBS (post-infectious or otherwise). Given lack of significant discomfort/pain, wouldn't necessarily classify patient as IBS-C at this time. Will continue to monitor.   Plan:   1. Start Miralax - 1 capful daily mixed in 8 oz of water. If stools are loose can cut down  "to 1 capful every other day or 1/2 capful daily.  2. Track your stools daily on a calendar.   3. Contact GI clinic in about 2 weeks with an update.  4. Continue with drinking 8 glasses of water.  5. Follow-up with LANEY Matos in 8-12 weeks.       Thank you for this consultation.  It was a pleasure to participate in the care of this patient; please contact us with any further questions.  A total of 45 face-to-face minutes was spent with this patient, >50% of which was counseling regarding the above delineated issues.    Zoya Ackerman MD   of Medicine  Division of Gastroenterology, Hepatology and Nutrition  [facility]    ---------------------------------------------------------------------------------------------------  HPI:  Ms. Baker is a 54 year old female with vitamin D deficiency, prior DVT (after surgery), s/p appy for ruptured appendix, constipation, and prior tubular adenoma on colonoscopy.     Ms. Baker was seen in our clinic over 3 years ago in our clinic by Yanet Franklin CNP. At that time she was found to have constipation and c/o issues with cramping and bloating as well as fatigue. She states that with lactose avoidance her her bloating and cramping completely resolved.  She tried treating her constipation with fiber but , ultimately, did not find it helpful and discontinued it's use.  She shares that she was treated with vitamin D as well which did improve her fatigue.      She reports that she is referred today after experiencing a few months of diarrhea this summer.  Ms. Baker states that in June 2018 she had \"severe\" diarrhea for which she missed work and had trouble leaving the house.  Her diarrhea was accompanied by severe nausea, though she experienced no vomiting. She had nocturnal stools and also pronounced fatigue.  She denied having any BRBPR.  She was seen in her primary care clinic where stool testing for C. difficile, Giardia, ova and " "parasites, and enteric panel were all negative.  She focused on rehydration and states that her fatigue and nausea resolved after a few days. Her nocturnal stooling also quickly resolved.     Unfortunately Ms. Baker reports that her loose stools persisted thereafter.  She states her primary care clinic recommended bland foods and loperamide which she tried early on without much success.  For the next 2 months she describes experiencing daytime loose stools.  Typically she would pass 2-4 clustered loose stools over an hour; occasionally she may have a day with a second cluster of loose stools.  Stools are Macoupin 5-7.  She expresses some concern about seeing vegetable material within her stools and notes that drinking alcohol made her loose stools much worse.    After 2 months she went on a trip and noted that her bowels slowed during this time.  Thereafter her bowels have returned to her prior baseline and she is now worried that she may not be moving her bowels enough.    Currently, she moves her bowels about once every 4 days. On the days she moves her bowels she will pass 4 bowel movements over the course of the morning. Now and generally feels \"empty\" thereafter.  Stools often start as Macoupin 1, then go to Macoupin 3-4. Ms. Baker denies any significant symptoms in between bowel movements and reports that she generally feels \"okay\".  She attributes the prior bloating and cramping noted in past documentation to lactose.  With defecation she does strain frequently and has at times noted extra tissue with wiping.  She has had rare spots of blood on the toilet paper in the past and reports that she has been told she has anal fissures previously.  She does not know if she is ever had hemorrhoids.    She is not currently on any bowel medications.  She is a vegetarian and has been for many years, reporting high dietary intake fiber.  As stated above she does avoid lactose.  For previous constipation management she " has tried supplemental fiber in the past without success.  She believes she had a short course of MiraLAX as well but does not remember how it went.    She underwent a colonoscopy in 2015 at the time she first began following in GI clinic.  Her initial colonoscopy could not be completed due to redundant colon, looping, and discomfort.  It was repeated under MAC sedation with success and one small tubular adenoma was resected.  She is due for repeat exam in 2020.      ROS:    Please see bottom of this note.     PROBLEM LIST  Patient Active Problem List    Diagnosis Date Noted     Neoplasm of uncertain behavior of skin 03/17/2019     Priority: Medium     Nevus, halo 03/17/2019     Priority: Medium     Xerosis of skin 12/17/2017     Priority: Medium     Seborrheic keratosis 12/17/2017     Priority: Medium     Multiple melanocytic nevi 12/17/2017     Priority: Medium     Vitamin D deficiency 04/13/2016     Priority: Medium     Melanocytic nevus of trunk 03/09/2016     Priority: Medium     Irritable bowel syndrome with constipation 01/06/2016     Priority: Medium     History of colonic polyps 01/06/2016     Priority: Medium     4 mm tubular adenoma ascending colon 12/2015. Next colonoscopy 2020 if not sooner for other purposes         PERTINENT PAST MEDICAL HISTORY:  Past Medical History:   Diagnosis Date     Abnormal Pap smear 2009    Past LEEP and colposcopy     DVT (deep venous thrombosis) (H) 2005    at time of bunionectomy, bilateral.     History of colonic polyps 2016    Removed at colonoscopy     IBS (irritable bowel syndrome)      Raynaud's disease 2005       PREVIOUS SURGERIES:  Past Surgical History:   Procedure Laterality Date     APPENDECTOMY  2002    ruptured     COLONOSCOPY  2016     EYE SURGERY  3/2017    PRK     ORTHOPEDIC SURGERY  Bunionectomy    2005? Deep vein thrombosis     ALLERGIES:   No Known Allergies    PERTINENT MEDICATIONS:  Current Outpatient Medications   Medication     Vitamin D,  Cholecalciferol, 1000 units TABS     Current Facility-Administered Medications   Medication     lidocaine 1% with EPINEPHrine 1:100,000 injection 3 mL       SOCIAL HISTORY:  Social History     Socioeconomic History     Marital status: Single     Spouse name: Not on file     Number of children: Not on file     Years of education: Not on file     Highest education level: Not on file   Occupational History     Not on file   Social Needs     Financial resource strain: Not on file     Food insecurity:     Worry: Not on file     Inability: Not on file     Transportation needs:     Medical: Not on file     Non-medical: Not on file   Tobacco Use     Smoking status: Never Smoker     Smokeless tobacco: Never Used   Substance and Sexual Activity     Alcohol use: Yes     Alcohol/week: 0.0 standard drinks     Comment: Occasional     Drug use: No     Sexual activity: Yes     Partners: Male     Birth control/protection: I.U.D.     Comment: mirena   Lifestyle     Physical activity:     Days per week: Not on file     Minutes per session: Not on file     Stress: Not on file   Relationships     Social connections:     Talks on phone: Not on file     Gets together: Not on file     Attends Holiness service: Not on file     Active member of club or organization: Not on file     Attends meetings of clubs or organizations: Not on file     Relationship status: Not on file     Intimate partner violence:     Fear of current or ex partner: Not on file     Emotionally abused: Not on file     Physically abused: Not on file     Forced sexual activity: Not on file   Other Topics Concern     Parent/sibling w/ CABG, MI or angioplasty before 65F 55M? Not Asked   Social History Narrative     Not on file       FAMILY HISTORY:  Family History   Problem Relation Age of Onset     Ovarian Cancer Mother         neg genetic testing     Psoriasis Son         27 2015, difficult to treat     Psoriasis Son         21 2015, easier to treat     Unknown/Adopted No  "family hx of         colon cancer, polyps, AID     Skin Cancer No family hx of         no skin cancer       Past/family/social history reviewed and no changes    PHYSICAL EXAMINATION:  Vitals /78   Pulse 76   Ht 1.676 m (5' 6\")   Wt 59.6 kg (131 lb 6.4 oz)   BMI 21.21 kg/m     Wt   Wt Readings from Last 2 Encounters:   12/11/19 59.6 kg (131 lb 6.4 oz)   09/20/19 60.1 kg (132 lb 9.6 oz)   Gen: Pt sitting up in NAD, interactive and cooperative on exam  Eyes: sclerae anicteric, no injection  ENT:  OP clear, MMM  Cardiac: RRR, nl S1, S2 no peripheral edema  Resp: Clear on anterior exam  GI: Normoactive BS, abd soft, flat, nontender  Skin: Warm, dry, no jaundice, nails appear healthy  Lymph: no submandibular, no cervical, and no supraclavicular LAD  Neuro: alert, oriented, answers questions appropriately      PERTINENT STUDIES:    Office Visit on 09/20/2019   Component Date Value Ref Range Status     25 OH Vit D2 09/20/2019 <5  ug/L Final     25 OH Vit D3 09/20/2019 49  ug/L Final     25 OH Vit D total 09/20/2019 <54  20 - 75 ug/L Final     Hepatitis C Antibody 09/20/2019 Nonreactive  NR^Nonreactive Final     HIV Antigen Antibody Combo 09/20/2019 Nonreactive  NR^Nonreactive     Final     Glucose 09/20/2019 79  70 - 99 mg/dL Final     Cholesterol 09/20/2019 217* <200 mg/dL Final     Triglycerides 09/20/2019 50  <150 mg/dL Final     HDL Cholesterol 09/20/2019 91  >49 mg/dL Final     LDL Cholesterol Calculated 09/20/2019 116* <100 mg/dL Final     Non HDL Cholesterol 09/20/2019 126  <130 mg/dL Final           Answers for HPI/ROS submitted by the patient on 11/27/2019   General Symptoms: No  Skin Symptoms: No  HENT Symptoms: No  EYE SYMPTOMS: No  HEART SYMPTOMS: No  LUNG SYMPTOMS: No  INTESTINAL SYMPTOMS: Yes  URINARY SYMPTOMS: No  GYNECOLOGIC SYMPTOMS: No  BREAST SYMPTOMS: No  SKELETAL SYMPTOMS: No  BLOOD SYMPTOMS: No  NERVOUS SYSTEM SYMPTOMS: Yes  MENTAL HEALTH SYMPTOMS: No  Heart burn or indigestion: No  Nausea: " Yes  Vomiting: No  Abdominal pain: No  Bloating: No  Constipation: Yes  Diarrhea: Yes  Blood in stool: No  Black stools: No  Rectal or Anal pain: No  Fecal incontinence: No  Yellowing of skin or eyes: No  Vomit with blood: No  Change in stools: Yes  Trouble with coordination: No  Dizziness or trouble with balance: No  Fainting or black-out spells: No  Memory loss: No  Headache: Yes  Seizures: No  Speech problems: No  Tingling: No  Tremor: No  Weakness: No  Difficulty walking: No  Paralysis: No  Numbness: No

## 2020-10-21 ASSESSMENT — ENCOUNTER SYMPTOMS
POOR WOUND HEALING: 0
NAIL CHANGES: 0
FLANK PAIN: 0
DIFFICULTY URINATING: 1
SKIN CHANGES: 0
HEMATURIA: 0
DYSURIA: 0

## 2020-10-21 ASSESSMENT — ANXIETY QUESTIONNAIRES
GAD7 TOTAL SCORE: 4
7. FEELING AFRAID AS IF SOMETHING AWFUL MIGHT HAPPEN: SEVERAL DAYS
3. WORRYING TOO MUCH ABOUT DIFFERENT THINGS: SEVERAL DAYS
4. TROUBLE RELAXING: SEVERAL DAYS
1. FEELING NERVOUS, ANXIOUS, OR ON EDGE: SEVERAL DAYS
GAD7 TOTAL SCORE: 4
5. BEING SO RESTLESS THAT IT IS HARD TO SIT STILL: NOT AT ALL
7. FEELING AFRAID AS IF SOMETHING AWFUL MIGHT HAPPEN: SEVERAL DAYS
2. NOT BEING ABLE TO STOP OR CONTROL WORRYING: NOT AT ALL
6. BECOMING EASILY ANNOYED OR IRRITABLE: NOT AT ALL

## 2020-10-22 ASSESSMENT — ANXIETY QUESTIONNAIRES: GAD7 TOTAL SCORE: 4

## 2020-11-04 ENCOUNTER — OFFICE VISIT (OUTPATIENT)
Dept: FAMILY MEDICINE | Facility: CLINIC | Age: 55
End: 2020-11-04
Attending: FAMILY MEDICINE
Payer: COMMERCIAL

## 2020-11-04 VITALS
WEIGHT: 134 LBS | HEIGHT: 67 IN | BODY MASS INDEX: 21.03 KG/M2 | SYSTOLIC BLOOD PRESSURE: 119 MMHG | DIASTOLIC BLOOD PRESSURE: 82 MMHG | HEART RATE: 70 BPM

## 2020-11-04 DIAGNOSIS — Z12.4 SCREENING FOR CERVICAL CANCER: Primary | ICD-10-CM

## 2020-11-04 DIAGNOSIS — Z12.31 ENCOUNTER FOR SCREENING MAMMOGRAM FOR BREAST CANCER: ICD-10-CM

## 2020-11-04 DIAGNOSIS — Z12.11 SCREEN FOR COLON CANCER: ICD-10-CM

## 2020-11-04 PROCEDURE — 99396 PREV VISIT EST AGE 40-64: CPT | Performed by: FAMILY MEDICINE

## 2020-11-04 PROCEDURE — G0008 ADMIN INFLUENZA VIRUS VAC: HCPCS

## 2020-11-04 PROCEDURE — 87624 HPV HI-RISK TYP POOLED RSLT: CPT | Performed by: FAMILY MEDICINE

## 2020-11-04 PROCEDURE — G0145 SCR C/V CYTO,THINLAYER,RESCR: HCPCS | Performed by: FAMILY MEDICINE

## 2020-11-04 PROCEDURE — G0463 HOSPITAL OUTPT CLINIC VISIT: HCPCS

## 2020-11-04 PROCEDURE — 90686 IIV4 VACC NO PRSV 0.5 ML IM: CPT

## 2020-11-04 PROCEDURE — 250N000011 HC RX IP 250 OP 636

## 2020-11-04 ASSESSMENT — PAIN SCALES - GENERAL: PAINLEVEL: NO PAIN (0)

## 2020-11-04 ASSESSMENT — MIFFLIN-ST. JEOR: SCORE: 1235.45

## 2020-11-04 NOTE — LETTER
2020       RE: Shi Baker  [address]  [address]  [facility] [address]     Dear Colleague,    Thank you for referring your patient, Shi Baker, to the [facility] [place] at [facility]. Please see a copy of my visit note below.    CC: Shi Baker is a 50 year old female who presents for routine health maintenance exam.        HPI:   1. IBS with constipation; taking Miralax daily, helping  2. Raynaud's-- Happens with cold weather, holding cold drink. Not in toes. No changes--stable  3. Sprained L ankle 3 weeks ago, and still swollen on lateral aspect, no bursing can walk on it, not treating--only rested it 3 day  3. Breast--no concerns. Due for mammogram,  all normal in past  4. Gyne-- Mirena IUD placed 2015, no menses. No problems, self checks strings  No  hot flashes and night sweats,    Had DVT in past, so stopped OCP, and menses were regular heavy, switched to Mirena.   Last Pap 2015, negative with negative HPV--due  . Had LEEP with HEIKE 2-3,  was last abnormal.     HTN with 1st pregnancy. 7-8# babies.    No vaginal symptoms.  Urinary:   Some urinary urgency, but wake 2/night. --unchanged from last year  Not currently sexually active with partner. 10 partners liftime,  men only. No STI, HIV test over 20 years ago. HPV vaccinated, has traveled to [place] and [place]; Hep B vaccinated,  Hep C and HIV testing negative   No sexual concerns, no hx abuse.   6. Low bone density --decreased dairy, taking Vit D 5000 units daily. Activity: walking/biking daily, at least 30 min. DEXA 2018 T score -1.7  7. Immunizations  --dtaP ,   Flu today  Shingrex due--discussed side effects and timing of vaccine  8. CV risk    ;  lipids 2019 normal  Glucose normal     9.Colonscopy , +polyp due back        PMH:  Ruptured appendix  DVT, below knee, not hospitalized                 Patient Active Problem List   Diagnosis      Irritable bowel syndrome with constipation     History of colonic polyps     Melanocytic nevus of trunk     Vitamin D deficiency           MEDS             Current Outpatient Prescriptions                 Current Outpatient Prescriptions   Medication Sig Dispense Refill     vitamin D (ERGOCALCIFEROL) 05574 UNIT capsule Take one twice weekly for 12 weeks. Then lab test AND start Vitamin D 2 or Vitamin D 3 5000 units daily. Recheck after 3 to 6 months. 24 capsule 0           Family History         Family History   Problem Relation Age of Onset     Ovarian Cancer Mother           neg genetic testing     Psoriasis Son           27 2015, difficult to treat     Psoriasis Son           21 2015, easier to treat     Unknown/Adopted No family hx of           colon cancer, polyps, AID     Skin Cancer No family hx of           no skin cancer         Patient adopted, but met mother while mother in her 30's--thinks BRCA  Negative.     No Known Allergies         Social History:     Social History                 Social History     Marital Status: Single         Spouse Name: N/A     Number of Children: N/A     Years of Education: N/A             Occupational History     Not on file.                   Social History Main Topics     Smoking status: Never Smoker      Smokeless tobacco: Not on file     Alcohol Use: 0.0 oz/week         0 Standard drinks or equivalent per week            Comment: Occasional     Drug Use: No     Sexual Activity:           Partners: Male         Birth Control/ Protection: IUD            Comment: mirena               Other Topics Concern     Not on file       Social History Narrative      Vegetarian--close to vegan, occ. 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      Answers for HPI/ROS submitted by the patient on  "10/21/2020   TED 7 TOTAL SCORE: 4  General Symptoms: No  Skin Symptoms: Yes  HENT Symptoms: No  EYE SYMPTOMS: No  HEART SYMPTOMS: No  LUNG SYMPTOMS: No  INTESTINAL SYMPTOMS: No  URINARY SYMPTOMS: Yes  GYNECOLOGIC SYMPTOMS: No  BREAST SYMPTOMS: No  SKELETAL SYMPTOMS: No  BLOOD SYMPTOMS: No  NERVOUS SYSTEM SYMPTOMS: No  MENTAL HEALTH SYMPTOMS: No  Changes in hair: No  Changes in moles/birth marks: No  Itching: Yes  Rashes: Yes--lesion on back, dry and itchy x 2 moths  Changes in nails: No  Acne: No  Hair in places you don't want it: No  Change in facial hair: No  Warts: No  Non-healing sores: No  Scarring: No  Flaking of skin: No  Color changes of hands/feet in cold : Yes  Sun sensitivity: No  Skin thickening: No  Trouble holding urine or incontinence: No  Pain or burning: No  Trouble starting or stopping: No  Increased frequency of urination: Yes  Blood in urine: No  Decreased frequency of urination: No  Frequent nighttime urination: Yes  Flank pain: No  Difficulty emptying bladder: Yes  Rest are known and chronic issues  Reviewed with patient     Blood pressure 119/82, pulse 70, height 1.702 m (5' 7\"), weight 60.8 kg (134 lb), not currently breastfeeding.  exam  3 mm sl pigmented lesion mid L back, excoriated, sl.rough  L ankle edema lateral aspect, nontender malleous or joint, ROM sl lmited laterally,     RICE ankle   No scratch skin lesions and can reeval      Yo West MD      "

## 2020-11-04 NOTE — PROGRESS NOTES
CC: Shi Baker is a 50 year old female who presents for routine health maintenance exam.        Chronic conditions    1. IBS with constipation; taking Miralax daily, helping  2. Raynaud's-- Happens with cold weather, holding cold drink. Not in toes. No changes--stable  3. Sprained L ankle 3 weeks ago, and still swollen on lateral aspect, no bursing can walk on it, not treating--only rested it 3 day    Preventive Care  3. Breast--no concerns. Due for mammogram,  all normal in past  4. Gyne-- Mirena IUD placed 2015, no menses. No problems, self checks strings  No  hot flashes and night sweats,    Had DVT in past, so stopped OCP, and menses were regular heavy, switched to Mirena.   Last Pap 2015, negative with negative HPV--due  .Had LEEP with HEIKE 2-3,  was last abnormal.     HTN with 1st pregnancy. 7-8# babies.    No vaginal symptoms.  Urinary:   Some urinary urgency, but wake 2/night. --unchanged from last year  Not currently sexually active with partner. 10 partners liftime,  men only. No STI, HIV test over 20 years ago. HPV vaccinated, has traveled to [place] and [place]; Hep B vaccinated,  Hep C and HIV testing negative   No sexual concerns, no history  abuse.   6. Low bone density --decreased dairy, taking Vit D 5000 units daily. Activity: walking/biking daily, at least 30 min.   DEXA 2018 T score -1.7  7. Immunizations  Last dtaP ,   Flu today  Shingrex due--discussed side effects and timing of vaccine  8. CV risk  ;  lipids  normal  Glucose normal     9 Colon Cancer screen: .Colonscopy , +polyp due back        PMH:  Ruptured appendix  DVT, below knee, not hospitalized       Current Outpatient Medications   Medication     Vitamin D, Cholecalciferol, 1000 units TABS     Current Facility-Administered Medications   Medication     lidocaine 1% with EPINEPHrine 1:100,000 injection 3 mL                 Patient Active Problem List   Diagnosis     Irritable bowel syndrome with  constipation     History of colonic polyps     Melanocytic nevus of trunk     Vitamin D deficiency           MEDS             Current Outpatient Prescriptions                 Current Outpatient Prescriptions   Medication Sig Dispense Refill     vitamin D (ERGOCALCIFEROL) 33284 UNIT capsule Take one twice weekly for 12 weeks. Then lab test AND start Vitamin D 2 or Vitamin D 3 5000 units daily. Recheck after 3 to 6 months. 24 capsule 0           Family History         Family History   Problem Relation Age of Onset     Ovarian Cancer Mother           neg genetic testing     Psoriasis Son           27 2015, difficult to treat     Psoriasis Son           21 2015, easier to treat     Unknown/Adopted No family hx of           colon cancer, polyps, AID     Skin Cancer No family hx of           no skin cancer         Patient adopted, but met mother while mother in her 30's--thinks BRCA  Negative.     No Known Allergies         Social History:     Social History                 Social History     Marital Status: Single         Spouse Name: N/A     Number of Children: N/A     Years of Education: N/A             Occupational History     Not on file.                   Social History Main Topics     Smoking status: Never Smoker      Smokeless tobacco: Not on file     Alcohol Use: 0.0 oz/week         0 Standard drinks or equivalent per week            Comment: Occasional     Drug Use: No     Sexual Activity:           Partners: Male         Birth Control/ Protection: IUD            Comment: mirena               Other Topics Concern     Not on file       Social History Narrative      Vegetarian--close to vegan, occ. 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      Answers for HPI/ROS submitted by the patient on 10/21/2020   TED 7 TOTAL SCORE:  "4  General Symptoms: No  Skin Symptoms: Yes  HENT Symptoms: No  EYE SYMPTOMS: No  HEART SYMPTOMS: No  LUNG SYMPTOMS: No  INTESTINAL SYMPTOMS: No  URINARY SYMPTOMS: Yes  GYNECOLOGIC SYMPTOMS: No  BREAST SYMPTOMS: No  SKELETAL SYMPTOMS: No  BLOOD SYMPTOMS: No  NERVOUS SYSTEM SYMPTOMS: No  MENTAL HEALTH SYMPTOMS: No  Changes in hair: No  Changes in moles/birth marks: No  Itching: Yes  Rashes: Yes--lesion on back, dry and itchy x 2 moths  Changes in nails: No  Acne: No  Hair in places you don't want it: No  Change in facial hair: No  Warts: No  Non-healing sores: No  Scarring: No  Flaking of skin: No  Color changes of hands/feet in cold : Yes  Sun sensitivity: No  Skin thickening: No  Trouble holding urine or incontinence: No  Pain or burning: No  Trouble starting or stopping: No  Increased frequency of urination: Yes  Blood in urine: No  Decreased frequency of urination: No  Frequent nighttime urination: Yes  Flank pain: No  Difficulty emptying bladder: Yes  Rest are known and chronic issues  Reviewed with patient     PE  Blood pressure 119/82, pulse 70, height 1.702 m (5' 7\"), weight 60.8 kg (134 lb), not currently breastfeeding.  Constitutional: Well appearing woman in no acute distress.   Psychological: appropriate mood.  Eyes: anicteric, normal extra-ocular movements,  pupils are equal and reactive to light.   Ears, Nose and Throat: tympanic membranes clear, nose clear and free of lesions, throat clear, moist mucous membrames, neck supple with full range of motion.    Neck: No thyroidmegaly. No jugular venous distension, no carotid bruits.  Cardiovascular: regular rate and rhythm, normal S1 and S2, no murmurs, rubs or gallops, peripheral pulses full and symmetric   Respiratory: clear to auscultation, no wheezes or crackles, normal breath sounds.  Gastrointestinal: positive bowel sounds, nontender, no hepatosplenomegaly, no masses. No guarding or rebound.  Genitourinary: External genitalia is normal appearance. No " enlargement of the [place] or [place] glands. Urethra and bladder are non-tender. Vagina is without lesions or discharge. Normal epithelium, no anterior or posterior wall defects. Cervix is smooth, without lesions, no cervical motion tenderness. Pap obtained with cytobrush.Perineum without lesions.   Lymphatic: no cervical lymphadenopathy.  Musculoskeletal: full range of motion, no edema and motor strength is equal in the upper and lower extremities  EXCEPT:  L ankle edema lateral aspect, nontender malleous or joint, ROM sl lmited laterally,   Skin: no concerning lesions, no jaundice.3 mm sl pigmented lesion mid L back, excoriated, sl.rough  Neurological: cranial nerves intact, normal strength and sensation, reflexes at patella and biceps normal, normal gait, no tremor.   Monofilament Foot Exam: n/a    A/P  1. HCM  Immunizations--  flu    Cancer screening-- Mammogram Pap/HPV Colonoscopy     CV risk- Up to date    Bone Health--see below       STI/HIV Screening --Up to Date    2. Sprained ankle. Counseled patient on RICE protocol, and if not improving over course of two weeks, to return. Can use NSAID, such as OTC ibuprofen 1-2 tabs tid prn x 2 weeks  3. Skin lesion--recommend no scratching area, and return for re-evaluation, difficult to assess due to excoriation  4. Low bone density--continue weight bearing activity, adequate Vit D intake, plan repeat DEXA in 1-2 years.     Follow-up 1 year and as needed for skin/ankle.

## 2020-11-05 ENCOUNTER — TELEPHONE (OUTPATIENT)
Dept: GASTROENTEROLOGY | Facility: CLINIC | Age: 55
End: 2020-11-05

## 2020-11-05 NOTE — TELEPHONE ENCOUNTER
Left message for patient to call back to schedule colonoscopy.     Procedure: Lower Endoscopy    Lower Endoscopy Type: Colonoscopy    Purpose of Colonoscopy Procedure: History of Polyps    Colonoscopy reason for referral: Screening and polyps 5 years ago    Colonoscopy Sedation: Deep/MAC    Preferred Location: [facility]    Scheduling Instructions: If you have not heard from the scheduling office within 2 business days, please call [phone].      Dx: Screen for colon cancer [Z12.11]

## 2020-11-09 ENCOUNTER — TELEPHONE (OUTPATIENT)
Dept: GASTROENTEROLOGY | Facility: CLINIC | Age: 55
End: 2020-11-09

## 2020-11-09 NOTE — TELEPHONE ENCOUNTER
Patient is scheduled for Colonoscopy with Dr. Bhardwaj    Spoke with: patient    Date of Procedure: 12-11-20    Location: [facility]    Sedation Type mac    Pre-op for Unit J MAC not needed    Informed patient they will need an adult  yes    Informed Patient of COVID Test Requirement yes 12-8-20    Preferred Pharmacy for Pre Prescription [facility] on [address]    Confirmed Nurse will call to complete assessment yes    Additional comments: none

## 2020-11-10 DIAGNOSIS — Z11.59 ENCOUNTER FOR SCREENING FOR OTHER VIRAL DISEASES: Primary | ICD-10-CM

## 2020-11-11 LAB
COPATH REPORT: NORMAL
PAP: NORMAL

## 2020-11-12 LAB
FINAL DIAGNOSIS: NORMAL
HPV HR 12 DNA CVX QL NAA+PROBE: NEGATIVE
HPV16 DNA SPEC QL NAA+PROBE: NEGATIVE
HPV18 DNA SPEC QL NAA+PROBE: NEGATIVE
SPECIMEN DESCRIPTION: NORMAL
SPECIMEN SOURCE CVX/VAG CYTO: NORMAL

## 2020-11-13 ENCOUNTER — ANCILLARY PROCEDURE (OUTPATIENT)
Dept: MAMMOGRAPHY | Facility: CLINIC | Age: 55
End: 2020-11-13
Attending: FAMILY MEDICINE
Payer: COMMERCIAL

## 2020-11-13 DIAGNOSIS — Z12.31 VISIT FOR SCREENING MAMMOGRAM: ICD-10-CM

## 2020-11-13 PROCEDURE — 77063 BREAST TOMOSYNTHESIS BI: CPT | Performed by: STUDENT IN AN ORGANIZED HEALTH CARE EDUCATION/TRAINING PROGRAM

## 2020-11-13 PROCEDURE — 77067 SCR MAMMO BI INCL CAD: CPT | Performed by: STUDENT IN AN ORGANIZED HEALTH CARE EDUCATION/TRAINING PROGRAM

## 2020-11-23 NOTE — RESULT ENCOUNTER NOTE
Dear Shi,     Here are your recent results which are within the expected range. Please continue with your current plan of care and let us know if you have any questions or concerns.    Regards,  Yo West MD

## 2020-11-23 NOTE — RESULT ENCOUNTER NOTE
Dear Shi,     Here are your recent results which are within the expected range--Pap was normal with negative HPV. Please continue with your current plan of care and let us know if you have any questions or concerns.    Regards,  Yo West MD

## 2020-12-08 DIAGNOSIS — Z11.59 ENCOUNTER FOR SCREENING FOR OTHER VIRAL DISEASES: ICD-10-CM

## 2020-12-08 LAB
SARS-COV-2 RNA SPEC QL NAA+PROBE: NOT DETECTED
SPECIMEN SOURCE: NORMAL

## 2020-12-08 PROCEDURE — U0003 INFECTIOUS AGENT DETECTION BY NUCLEIC ACID (DNA OR RNA); SEVERE ACUTE RESPIRATORY SYNDROME CORONAVIRUS 2 (SARS-COV-2) (CORONAVIRUS DISEASE [COVID-19]), AMPLIFIED PROBE TECHNIQUE, MAKING USE OF HIGH THROUGHPUT TECHNOLOGIES AS DESCRIBED BY CMS-2020-01-R: HCPCS | Performed by: INTERNAL MEDICINE

## 2020-12-11 ENCOUNTER — DOCUMENTATION ONLY (OUTPATIENT)
Dept: GASTROENTEROLOGY | Facility: OUTPATIENT CENTER | Age: 55
End: 2020-12-11
Attending: FAMILY MEDICINE
Payer: COMMERCIAL

## 2020-12-11 ENCOUNTER — TRANSFERRED RECORDS (OUTPATIENT)
Dept: HEALTH INFORMATION MANAGEMENT | Facility: CLINIC | Age: 55
End: 2020-12-11

## 2020-12-11 DIAGNOSIS — Z12.11 SCREEN FOR COLON CANCER: ICD-10-CM

## 2020-12-14 LAB — COPATH REPORT: NORMAL

## 2020-12-29 ENCOUNTER — ALLIED HEALTH/NURSE VISIT (OUTPATIENT)
Dept: OBGYN | Facility: CLINIC | Age: 55
End: 2020-12-29
Payer: COMMERCIAL

## 2020-12-29 DIAGNOSIS — Z23 NEED FOR VIRAL IMMUNIZATION: Primary | ICD-10-CM

## 2020-12-29 PROCEDURE — 90471 IMMUNIZATION ADMIN: CPT

## 2020-12-29 PROCEDURE — 90750 HZV VACC RECOMBINANT IM: CPT

## 2020-12-29 PROCEDURE — 250N000021 HC RX MED A9270 GY (STAT IND- M) 250

## 2020-12-29 PROCEDURE — 999N000103 HC STATISTIC NO CHARGE FACILITY FEE

## 2021-09-04 ENCOUNTER — HEALTH MAINTENANCE LETTER (OUTPATIENT)
Age: 56
End: 2021-09-04

## 2021-11-24 ENCOUNTER — IMMUNIZATION (OUTPATIENT)
Dept: NURSING | Facility: CLINIC | Age: 56
End: 2021-11-24
Payer: COMMERCIAL

## 2021-11-24 PROCEDURE — 91300 PR COVID VAC PFIZER DIL RECON 30 MCG/0.3 ML IM: CPT

## 2021-11-24 PROCEDURE — 0004A PR COVID VAC PFIZER DIL RECON 30 MCG/0.3 ML IM: CPT

## 2021-12-08 ENCOUNTER — ANCILLARY PROCEDURE (OUTPATIENT)
Dept: MAMMOGRAPHY | Facility: CLINIC | Age: 56
End: 2021-12-08
Attending: FAMILY MEDICINE
Payer: COMMERCIAL

## 2021-12-08 DIAGNOSIS — Z12.31 VISIT FOR SCREENING MAMMOGRAM: ICD-10-CM

## 2021-12-08 PROCEDURE — 77067 SCR MAMMO BI INCL CAD: CPT | Mod: GC | Performed by: RADIOLOGY

## 2021-12-08 PROCEDURE — 77063 BREAST TOMOSYNTHESIS BI: CPT | Mod: GC | Performed by: RADIOLOGY

## 2021-12-14 ASSESSMENT — ANXIETY QUESTIONNAIRES
5. BEING SO RESTLESS THAT IT IS HARD TO SIT STILL: NOT AT ALL
4. TROUBLE RELAXING: NOT AT ALL
7. FEELING AFRAID AS IF SOMETHING AWFUL MIGHT HAPPEN: NOT AT ALL
2. NOT BEING ABLE TO STOP OR CONTROL WORRYING: NOT AT ALL
1. FEELING NERVOUS, ANXIOUS, OR ON EDGE: SEVERAL DAYS
3. WORRYING TOO MUCH ABOUT DIFFERENT THINGS: SEVERAL DAYS
6. BECOMING EASILY ANNOYED OR IRRITABLE: NOT AT ALL
GAD7 TOTAL SCORE: 2
7. FEELING AFRAID AS IF SOMETHING AWFUL MIGHT HAPPEN: NOT AT ALL
GAD7 TOTAL SCORE: 2

## 2021-12-15 ASSESSMENT — ANXIETY QUESTIONNAIRES: GAD7 TOTAL SCORE: 2

## 2021-12-15 NOTE — RESULT ENCOUNTER NOTE
Dear Shi,     Here are your recent results which are within the expected normal range. Please continue with your current plan of care and let us know if you have any questions or concerns.    Regards,  Yo West MD

## 2021-12-17 ENCOUNTER — OFFICE VISIT (OUTPATIENT)
Dept: FAMILY MEDICINE | Facility: CLINIC | Age: 56
End: 2021-12-17
Attending: FAMILY MEDICINE
Payer: COMMERCIAL

## 2021-12-17 VITALS
WEIGHT: 136.5 LBS | HEART RATE: 69 BPM | DIASTOLIC BLOOD PRESSURE: 83 MMHG | BODY MASS INDEX: 21.43 KG/M2 | HEIGHT: 67 IN | SYSTOLIC BLOOD PRESSURE: 123 MMHG

## 2021-12-17 DIAGNOSIS — M54.2 NECK PAIN: ICD-10-CM

## 2021-12-17 DIAGNOSIS — M85.9 LOW BONE DENSITY FOR AGE: Primary | ICD-10-CM

## 2021-12-17 DIAGNOSIS — Z00.00 ENCOUNTER FOR PREVENTIVE HEALTH EXAMINATION: ICD-10-CM

## 2021-12-17 PROCEDURE — 250N000021 HC RX MED A9270 GY (STAT IND- M) 250

## 2021-12-17 PROCEDURE — G0463 HOSPITAL OUTPT CLINIC VISIT: HCPCS | Mod: 25

## 2021-12-17 PROCEDURE — G0008 ADMIN INFLUENZA VIRUS VAC: HCPCS

## 2021-12-17 PROCEDURE — 99396 PREV VISIT EST AGE 40-64: CPT | Mod: 25 | Performed by: FAMILY MEDICINE

## 2021-12-17 PROCEDURE — 250N000011 HC RX IP 250 OP 636

## 2021-12-17 PROCEDURE — 90471 IMMUNIZATION ADMIN: CPT

## 2021-12-17 PROCEDURE — 90686 IIV4 VACC NO PRSV 0.5 ML IM: CPT

## 2021-12-17 PROCEDURE — 90750 HZV VACC RECOMBINANT IM: CPT

## 2021-12-17 PROCEDURE — 58301 REMOVE INTRAUTERINE DEVICE: CPT | Performed by: FAMILY MEDICINE

## 2021-12-17 PROCEDURE — 90472 IMMUNIZATION ADMIN EACH ADD: CPT

## 2021-12-17 ASSESSMENT — MIFFLIN-ST. JEOR: SCORE: 1241.79

## 2021-12-17 NOTE — LETTER
2021       RE: Shi Baker  [address]  [address]  [facility] [address]     Dear Colleague,    Thank you for referring your patient, Shi Baker, to the [facility] [place] at [facility]. Please see a copy of my visit note below.    CC: Shi Baker is a 50 year old female who presents for routine health maintenance exam.        Chronic conditions     1. IBS with constipation; taking Miralax daily, doing well  2. Raynaud's-- Happens with cold weather, stable .     Preventive Care  3. Breast--no concerns. normal mammogram 2021  4. Gyne-- Mirena IUD placed 2015, at end of lifespan, no menses. No problems,  --discussed options given likelihood of menopause; plan remove today  No  hot flashes and night sweats,    Had DVT in past, so stopped OCP, and menses were regular heavy, switched to Mirena.   Last Pap 2020, negative with negative HPV--  .Had LEEP with HEIKE 2-3,  was last abnormal.     HTN with 1st pregnancy. 7-8# babies.    No vaginal symptoms  Urinary:   Some urinary urgency, but wake 2/night. --unchanged   Not currently sexually active with partner. 10 partners liftime,  men only. No STI, HIV test over 20 years ago. HPV vaccinated, has traveled to [place] and [place]; Hep B vaccinated,  Hep C and HIV testing negative   No sexual concerns, no history  abuse.     6. Low bone density --decreased dairy, taking Vit D 5000 units daily. Activity: walking/biking daily, at least 30 min.   DEXA  T score -1.7, due for repeat  Family history: adopted    7. Immunizations  Last dtaP ,   Flu today  Shingrex due for 2nd today    8. CV risk  ;  lipids 2019 normal  Glucose normal      9 Colon Cancer screen: .Colonscopy 2020 follow-up 10 years         PMH:  Ruptured appendix  DVT, below knee, not hospitalized           Current Outpatient Medications   Medication     Vitamin D, Cholecalciferol, 1000 units TABS           Current Facility-Administered Medications   Medication     lidocaine 1% with EPINEPHrine 1:100,000 injection 3 mL                   Patient Active Problem List   Diagnosis     Irritable bowel syndrome with constipation     History of colonic polyps     Melanocytic nevus of trunk     Vitamin D deficiency           MEDS                Current Outpatient Prescriptions                  Current Outpatient Prescriptions   Medication Sig Dispense Refill     vitamin D (ERGOCALCIFEROL) 23888 UNIT capsule Take one twice weekly for 12 weeks. Then lab test AND start Vitamin D 2 or Vitamin D 3 5000 units daily. Recheck after 3 to 6 months. 24 capsule 0           Family History             Family History   Problem Relation Age of Onset     Ovarian Cancer Mother           neg genetic testing     Psoriasis Son           27 2015, difficult to treat     Psoriasis Son           21 2015, easier to treat     Unknown/Adopted No family hx of           colon cancer, polyps, AID     Skin Cancer No family hx of           no skin cancer         Patient adopted, but met mother while mother in her 30's--thinks BRCA  Negative.     No Known Allergies            Social History:      Social History                 Social History     Marital Status: Single         Spouse Name: N/A     Number of Children: N/A     Years of Education: N/A             Occupational History     Not on file.                   Social History Main Topics     Smoking status: Never Smoker      Smokeless tobacco: Not on file     Alcohol Use: 0.0 oz/week         0 Standard drinks or equivalent per week            Comment: Occasional     Drug Use: No     Sexual Activity:           Partners: Male         Birth Control/ Protection: IUD            Comment: mirena               Other Topics Concern     Not on file       Social History Narrative      Vegetarian--close to vegan, occ. 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  If move arm around, improves tingling  X 1 year, worse 6-9 months    PE  Blood pressure 123/83, pulse 69, height 1.702 m (5' 7\"), weight 61.9 kg (136 lb 8 oz), not currently breastfeeding.  EXAM:  Constitutional: healthy, alert and no distress   Cardiovascular: negative, PMI normal. No lifts, heaves, or thrills. RRR. No murmurs, clicks gallops or rub  Respiratory: negative, Percussion normal. Good diaphragmatic excursion. Lungs clear  Psychiatric: mentation appears normal and affect normal/bright  Neck: Neck supple. No adenopathy. Thyroid symmetric, normal size,, Carotids without bruits.  ENT: ENT exam normal, no neck nodes or sinus tenderness  Abdomen: Abdomen soft, non-tender. BS normal. No masses, organomegaly  :  Normal external genitalia without lesions,vaginal walls intact and cervix without lesions, clear secretions--IUD removed without difficulty with ring forceps  NEURO: Gait normal. Reflexes normal and symmetric. Sensation grossly WNL., negative findings: cranial nerves 2-12 intact, muscle strength normal, reflexes normal and symmetric  SKIN: no suspicious lesions or rashes  LYMPH: Normal cervical lymph nodes  JOINT/EXTREMITIES: extremities normal- no gross deformities noted, gait normal and normal muscle tone    A/P  1. HCM  Immunizations--  " Shingrex, flu vaccine today, counseled need for 2nd Shingrex vaccine in 2-6 months    Cancer screening--Up to date      CV risk- Up to date    Bone Health--Low bione density, repeat DEXA  Contnue adequate weight bearing activity and Vitamin D intake    STI/HIV Screening --Up to Date      Contraception--not needed    2. Right neck pain with radiculopathy   X-ray cervical spine  Schedule follow up for further evaluation within 1 month  Otherwise annual in 1 year          Again, thank you for allowing me to participate in the care of your patient.      Sincerely,    Yo West MD

## 2021-12-17 NOTE — PROGRESS NOTES
CC: Shi Baker is a 50 year old female who presents for routine health maintenance exam.        Chronic conditions     1. IBS with constipation; taking Miralax daily, doing well  2. Raynaud's-- Happens with cold weather, stable .     Preventive Care  3. Breast--no concerns. normal mammogram 2021  4. Gyne-- Mirena IUD placed 2015, at end of lifespan, no menses. No problems,  --discussed options given likelihood of menopause; plan remove today  No  hot flashes and night sweats,    Had DVT in past, so stopped OCP, and menses were regular heavy, switched to Mirena.   Last Pap 2020, negative with negative HPV--  .Had LEEP with HEIKE 2-3,  was last abnormal.     HTN with 1st pregnancy. 7-8# babies.    No vaginal symptoms  Urinary:   Some urinary urgency, but wake 2/night. --unchanged   Not currently sexually active with partner. 10 partners liftime,  men only. No STI, HIV test over 20 years ago. HPV vaccinated, has traveled to [place] and [place]; Hep B vaccinated,  Hep C and HIV testing negative   No sexual concerns, no history  abuse.     6. Low bone density --decreased dairy, taking Vit D 5000 units daily. Activity: walking/biking daily, at least 30 min.   DEXA 2018 T score -1.7, due for repeat  Family history: adopted    7. Immunizations  Last dtaP ,   Flu today  Shingrex due for 2nd today    8. CV risk  ;  lipids  normal  Glucose normal      9 Colon Cancer screen: .Colonscopy 2020 follow-up 10 years         PMH:  Ruptured appendix  DVT, below knee, not hospitalized           Current Outpatient Medications   Medication     Vitamin D, Cholecalciferol, 1000 units TABS          Current Facility-Administered Medications   Medication     lidocaine 1% with EPINEPHrine 1:100,000 injection 3 mL                   Patient Active Problem List   Diagnosis     Irritable bowel syndrome with constipation     History of colonic polyps     Melanocytic nevus of trunk     Vitamin D deficiency            MEDS                Current Outpatient Prescriptions                  Current Outpatient Prescriptions   Medication Sig Dispense Refill     vitamin D (ERGOCALCIFEROL) 39166 UNIT capsule Take one twice weekly for 12 weeks. Then lab test AND start Vitamin D 2 or Vitamin D 3 5000 units daily. Recheck after 3 to 6 months. 24 capsule 0           Family History             Family History   Problem Relation Age of Onset     Ovarian Cancer Mother           neg genetic testing     Psoriasis Son           27 2015, difficult to treat     Psoriasis Son           21 2015, easier to treat     Unknown/Adopted No family hx of           colon cancer, polyps, AID     Skin Cancer No family hx of           no skin cancer         Patient adopted, but met mother while mother in her 30's--thinks BRCA  Negative.     No Known Allergies            Social History:      Social History                 Social History     Marital Status: Single         Spouse Name: N/A     Number of Children: N/A     Years of Education: N/A             Occupational History     Not on file.                   Social History Main Topics     Smoking status: Never Smoker      Smokeless tobacco: Not on file     Alcohol Use: 0.0 oz/week         0 Standard drinks or equivalent per week            Comment: Occasional     Drug Use: No     Sexual Activity:           Partners: Male         Birth Control/ Protection: IUD            Comment: mirena               Other Topics Concern     Not on file       Social History Narrative      Vegetarian--close to vegan, occ. 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  If move arm around, improves tingling  X 1 year, worse 6-9 months    PE  Blood pressure 123/83, pulse 69, height 1.702 m (5' 7\"), weight 61.9 kg (136 lb 8 oz), not currently breastfeeding.  EXAM:  Constitutional: healthy, alert and no distress   Cardiovascular: negative, PMI normal. No lifts, heaves, or thrills. RRR. No murmurs, clicks gallops or rub  Respiratory: negative, Percussion normal. Good diaphragmatic excursion. Lungs clear  Psychiatric: mentation appears normal and affect normal/bright  Neck: Neck supple. No adenopathy. Thyroid symmetric, normal size,, Carotids without bruits.  ENT: ENT exam normal, no neck nodes or sinus tenderness  Abdomen: Abdomen soft, non-tender. BS normal. No masses, organomegaly  :  Normal external genitalia without lesions,vaginal walls intact and cervix without lesions, clear secretions--IUD removed without difficulty with ring forceps  NEURO: Gait normal. Reflexes normal and symmetric. Sensation grossly WNL., negative findings: cranial nerves 2-12 intact, muscle strength normal, reflexes normal and symmetric  SKIN: no suspicious lesions or rashes  LYMPH: Normal cervical lymph nodes  JOINT/EXTREMITIES: extremities normal- no gross deformities noted, gait normal and normal muscle tone    A/P  1. HCM  Immunizations--  Shingrex, flu vaccine today, counseled need for 2nd Shingrex vaccine in 2-6 months    Cancer screening--Up to date      CV risk- Up to date    Bone Health--Low bione density, repeat DEXA  Contnue adequate weight bearing activity and Vitamin D intake    STI/HIV Screening --Up to Date      Contraception--not needed    2. Right neck pain " with radiculopathy   X-ray cervical spine  Schedule follow up for further evaluation within 1 month  Otherwise annual in 1 year

## 2021-12-25 ENCOUNTER — HEALTH MAINTENANCE LETTER (OUTPATIENT)
Age: 56
End: 2021-12-25

## 2021-12-27 ENCOUNTER — ANCILLARY PROCEDURE (OUTPATIENT)
Dept: GENERAL RADIOLOGY | Facility: CLINIC | Age: 56
End: 2021-12-27
Attending: FAMILY MEDICINE
Payer: COMMERCIAL

## 2021-12-27 ENCOUNTER — ANCILLARY PROCEDURE (OUTPATIENT)
Dept: BONE DENSITY | Facility: CLINIC | Age: 56
End: 2021-12-27
Attending: FAMILY MEDICINE
Payer: COMMERCIAL

## 2021-12-27 DIAGNOSIS — M54.2 NECK PAIN: ICD-10-CM

## 2021-12-27 DIAGNOSIS — M85.9 LOW BONE DENSITY FOR AGE: ICD-10-CM

## 2021-12-27 PROCEDURE — 77080 DXA BONE DENSITY AXIAL: CPT | Performed by: INTERNAL MEDICINE

## 2021-12-27 PROCEDURE — 72040 X-RAY EXAM NECK SPINE 2-3 VW: CPT | Mod: GC | Performed by: RADIOLOGY

## 2022-01-07 NOTE — RESULT ENCOUNTER NOTE
Dear Shi,     Here are your recent results. Your bone density still remains in the low bone density range, but has dropped further (by 7.6%) at each femur (bone that attaches to the hip). I recommend that we repeat a DEXA in 2 years, and you continue Vitamin D supplement 1000 units daily, as well as daily weight bearing activity.     Please let us know if you have any questions or concerns.    Regards,  Yo West MD

## 2022-01-07 NOTE — RESULT ENCOUNTER NOTE
Dear Shi,     Here are your recent results. The X-ray shows some arthritis type changes in your neck, mainly at the last two neck vertebrae C6 and 7, but no other abnormalities. If you are continuing to have symptoms, I can refer you to orthopedics.    Please let us know if you have any questions or concerns.    Regards,  Yo West MD

## 2022-10-22 ENCOUNTER — HEALTH MAINTENANCE LETTER (OUTPATIENT)
Age: 57
End: 2022-10-22

## 2023-03-25 ASSESSMENT — ENCOUNTER SYMPTOMS
NECK PAIN: 1
BACK PAIN: 1

## 2023-03-31 ENCOUNTER — OFFICE VISIT (OUTPATIENT)
Dept: INTERNAL MEDICINE | Facility: CLINIC | Age: 58
End: 2023-03-31
Payer: COMMERCIAL

## 2023-03-31 ENCOUNTER — LAB (OUTPATIENT)
Dept: LAB | Facility: CLINIC | Age: 58
End: 2023-03-31
Payer: COMMERCIAL

## 2023-03-31 VITALS
DIASTOLIC BLOOD PRESSURE: 86 MMHG | BODY MASS INDEX: 21.63 KG/M2 | HEIGHT: 67 IN | HEART RATE: 82 BPM | WEIGHT: 137.8 LBS | OXYGEN SATURATION: 98 % | SYSTOLIC BLOOD PRESSURE: 120 MMHG

## 2023-03-31 DIAGNOSIS — Z00.00 PREVENTATIVE HEALTH CARE: ICD-10-CM

## 2023-03-31 DIAGNOSIS — Z12.31 ENCOUNTER FOR SCREENING MAMMOGRAM FOR BREAST CANCER: ICD-10-CM

## 2023-03-31 DIAGNOSIS — M54.50 ACUTE LEFT-SIDED LOW BACK PAIN WITHOUT SCIATICA: ICD-10-CM

## 2023-03-31 DIAGNOSIS — Z00.00 ROUTINE GENERAL MEDICAL EXAMINATION AT A HEALTH CARE FACILITY: Primary | ICD-10-CM

## 2023-03-31 DIAGNOSIS — Z23 NEED FOR VACCINATION: ICD-10-CM

## 2023-03-31 LAB
ANION GAP SERPL CALCULATED.3IONS-SCNC: 10 MMOL/L (ref 7–15)
BUN SERPL-MCNC: 9.3 MG/DL (ref 6–20)
CALCIUM SERPL-MCNC: 10.1 MG/DL (ref 8.6–10)
CHLORIDE SERPL-SCNC: 103 MMOL/L (ref 98–107)
CHOLEST SERPL-MCNC: 215 MG/DL
CREAT SERPL-MCNC: 0.78 MG/DL (ref 0.51–0.95)
DEPRECATED HCO3 PLAS-SCNC: 27 MMOL/L (ref 22–29)
GFR SERPL CREATININE-BSD FRML MDRD: 88 ML/MIN/1.73M2
GLUCOSE SERPL-MCNC: 92 MG/DL (ref 70–99)
HDLC SERPL-MCNC: 112 MG/DL
LDLC SERPL CALC-MCNC: 90 MG/DL
NONHDLC SERPL-MCNC: 103 MG/DL
POTASSIUM SERPL-SCNC: 4.1 MMOL/L (ref 3.4–5.3)
SODIUM SERPL-SCNC: 140 MMOL/L (ref 136–145)
TRIGL SERPL-MCNC: 67 MG/DL
TSH SERPL DL<=0.005 MIU/L-ACNC: 1.02 UIU/ML (ref 0.3–4.2)

## 2023-03-31 PROCEDURE — 80048 BASIC METABOLIC PNL TOTAL CA: CPT | Performed by: PATHOLOGY

## 2023-03-31 PROCEDURE — 80061 LIPID PANEL: CPT | Performed by: PATHOLOGY

## 2023-03-31 PROCEDURE — 36415 COLL VENOUS BLD VENIPUNCTURE: CPT | Performed by: PATHOLOGY

## 2023-03-31 PROCEDURE — 99386 PREV VISIT NEW AGE 40-64: CPT | Mod: 25 | Performed by: INTERNAL MEDICINE

## 2023-03-31 PROCEDURE — 90471 IMMUNIZATION ADMIN: CPT | Performed by: INTERNAL MEDICINE

## 2023-03-31 PROCEDURE — 90715 TDAP VACCINE 7 YRS/> IM: CPT | Performed by: INTERNAL MEDICINE

## 2023-03-31 PROCEDURE — 84443 ASSAY THYROID STIM HORMONE: CPT | Performed by: PATHOLOGY

## 2023-03-31 ASSESSMENT — ENCOUNTER SYMPTOMS
HEMOPTYSIS: 0
COUGH: 0
PARALYSIS: 0
HOT FLASHES: 0
POOR WOUND HEALING: 0
TACHYCARDIA: 0
HEADACHES: 0
HEMATURIA: 0
CHILLS: 0
BACK PAIN: 1
WEIGHT LOSS: 0
DIZZINESS: 0
DIARRHEA: 0
SMELL DISTURBANCE: 0
ORTHOPNEA: 0
CLAUDICATION: 0
SLEEP DISTURBANCES DUE TO BREATHING: 0
SPUTUM PRODUCTION: 0
WEIGHT GAIN: 0
COUGH DISTURBING SLEEP: 0
RECTAL BLEEDING: 0
BREAST MASS: 0
BRUISES/BLEEDS EASILY: 0
LOSS OF CONSCIOUSNESS: 0
SNORES LOUDLY: 0
EYE IRRITATION: 0
DEPRESSION: 0
TASTE DISTURBANCE: 0
FATIGUE: 0
CONSTIPATION: 0
SINUS CONGESTION: 0
PALPITATIONS: 0
DOUBLE VISION: 0
NAUSEA: 0
SYNCOPE: 0
PANIC: 0
NERVOUS/ANXIOUS: 0
EYE WATERING: 0
DIFFICULTY URINATING: 0
BOWEL INCONTINENCE: 0
BLOATING: 0
SEIZURES: 0
DECREASED APPETITE: 0
INSOMNIA: 0
NUMBNESS: 0
JAUNDICE: 0
EYE PAIN: 0
RESPIRATORY PAIN: 0
EXERCISE INTOLERANCE: 0
HYPOTENSION: 0
LIGHT-HEADEDNESS: 0
FLANK PAIN: 0
TINGLING: 0
WEAKNESS: 0
SWOLLEN GLANDS: 0
SPEECH CHANGE: 0
DECREASED CONCENTRATION: 0
MEMORY LOSS: 0
TROUBLE SWALLOWING: 0
POLYDIPSIA: 0
EYE REDNESS: 0
RECTAL PAIN: 0
ALTERED TEMPERATURE REGULATION: 0
NIGHT SWEATS: 0
LEG PAIN: 0
FEVER: 0
NECK MASS: 0
DYSPNEA ON EXERTION: 0
DYSURIA: 0
SORE THROAT: 0
HYPERTENSION: 0
NECK PAIN: 1
DECREASED LIBIDO: 0
SHORTNESS OF BREATH: 0
HOARSE VOICE: 0
SKIN CHANGES: 0
DISTURBANCES IN COORDINATION: 0
TREMORS: 0
BREAST PAIN: 0
LEG SWELLING: 0
HALLUCINATIONS: 0
BLOOD IN STOOL: 0
INCREASED ENERGY: 0
HEARTBURN: 0
POSTURAL DYSPNEA: 0
VOMITING: 0
WHEEZING: 0
EXTREMITY NUMBNESS: 0
POLYPHAGIA: 0
ABDOMINAL PAIN: 0
NAIL CHANGES: 0
SINUS PAIN: 0

## 2023-03-31 NOTE — NURSING NOTE
Shi Baker is a 57 year old female that presents in clinic today for the following:     Chief Complaint   Patient presents with     Establish Care     Pt here to establish care with provider.   Concerns:  Pt states Sciatica pain/ lower back L side pain       The patient's allergies and medications were reviewed. The patient's vitals were obtained without incident. The patient does not have any other questions for the provider.     Iesha Causey, UMER at 10:25 AM on 3/31/2023.  Primary Care Clinic: [phone]

## 2023-03-31 NOTE — PATIENT INSTRUCTIONS
To schedule your mammogram, please call [facility] at [phone]; Option 4.        Preventive Health Recommendations  Female Ages 50 - 64    Yearly exam: See your health care provider every year in order to  Review health changes.   Discuss preventive care.    Review your medicines if your doctor has prescribed any.    Get a Pap test every three years (unless you have an abnormal result and your provider advises testing more often).  If you get Pap tests with HPV test, you only need to test every 5 years, unless you have an abnormal result.   You do not need a Pap test if your uterus was removed (hysterectomy) and you have not had cancer.  You should be tested each year for STDs (sexually transmitted diseases) if you're at risk.   Have a mammogram every 1 to 2 years.  Have a colonoscopy at age 50, or have a yearly FIT test (stool test). These exams screen for colon cancer.    Have a cholesterol test every 5 years, or more often if advised.  Have a diabetes test (fasting glucose) every three years. If you are at risk for diabetes, you should have this test more often.   If you are at risk for osteoporosis (brittle bone disease), think about having a bone density scan (DEXA).    Shots: Get a flu shot each year. Get a tetanus shot every 10 years.    Nutrition:   Eat at least 5 servings of fruits and vegetables each day.  Eat whole-grain bread, whole-wheat pasta and brown rice instead of white grains and rice.  Get adequate Calcium and Vitamin D.     Lifestyle  Exercise at least 150 minutes a week (30 minutes a day, 5 days a week). This will help you control your weight and prevent disease.  Limit alcohol to one drink per day.  No smoking.   Wear sunscreen to prevent skin cancer.   See your dentist every six months for an exam and cleaning.  See your eye doctor every 1 to 2 years.

## 2023-03-31 NOTE — PROGRESS NOTES
SUBJECTIVE:   CC: Shi Baker is an 57 year old woman who presents for preventive health visit.       Patient has been advised of split billing requirements and indicates understanding: Yes  Healthy Habits:    Do you get at least three servings of calcium containing foods daily (dairy, green leafy vegetables, etc.)? yes    Amount of exercise or daily activities, outside of work: walking daily, 1-3 miles    Problems taking medications regularly No    Medication side effects: No    Have you had an eye exam in the past two years? yes    Do you see a dentist twice per year? yes    Do you have sleep apnea, excessive snoring or daytime drowsiness?no      -------------------------------------    Today's PHQ-2 Score:   PHQ-2 ( 1999 [employer]) 3/31/2023 9/20/2019   Q1: Little interest or pleasure in doing things 0 0   Q2: Feeling down, depressed or hopeless 0 0   PHQ-2 Score 0 0   PHQ-2 Total Score (12-17 Years)- Positive if 3 or more points; Administer PHQ-A if positive - 0   Q1: Little interest or pleasure in doing things - -   Q2: Feeling down, depressed or hopeless - -   PHQ-2 Score - -     Patient reports low back pain that is left-sided. It started about a month ago. She has some numbness in the left leg. She reports improvement in pain over the last few days. SHe has been doing some stretches which have been helpful.     Abuse: Current or Past(Physical, Sexual or Emotional)- No  Do you feel safe in your environment? No    Have you ever done Advance Care Planning? (For example, a Health Directive, POLST, or a discussion with a medical provider or your loved ones about your wishes): No, advance care planning information given to patient to review.  Patient plans to discuss their wishes with loved ones or provider.      Social History     Tobacco Use     Smoking status: Never     Smokeless tobacco: Never   Substance Use Topics     Alcohol use: Yes     Alcohol/week: 1.0 standard drink     Types: 1 Standard drinks or  equivalent per week     Comment: Occasional     If you drink alcohol do you typically have >3 drinks per day or >7 drinks per week? No                     Reviewed orders with patient.  Reviewed health maintenance and updated orders accordingly - Yes  Labs reviewed in [facility]:   Breast CA Risk Assessment (FHS-7) 12/8/2021   Did any of your first-degree relatives have breast or ovarian cancer? Yes   Did any of your relatives have bilateral breast cancer? Unknown   Did any man in your family have breast cancer? Unknown   Did any woman in your family have breast and ovarian cancer? Unknown   Did any woman in your family have breast cancer before age 50 y? Unknown   Do you have 2 or more relatives with breast and/or ovarian cancer? Unknown   Do you have 2 or more relatives with breast and/or bowel cancer? Unknown       Mammogram Screening: Recommended mammography every 1-2 years with patient discussion and risk factor consideration  Pertinent mammograms are reviewed under the imaging tab.    Pertinent mammograms are reviewed under the imaging tab.  History of abnormal Pap smear: NO - age 30-65 PAP every 5 years with negative HPV co-testing recommended  PAP / HPV Latest Ref Rng & Units 11/4/2020   PAP (Historical) - NIL   HPV16 NEG:Negative Negative   HPV18 NEG:Negative Negative   HRHPV NEG:Negative Negative     Reviewed and updated as needed this visit by clinical staff   Tobacco  Allergies  Meds              Reviewed and updated as needed this visit by Provider                 Past Medical History:   Diagnosis Date     Abnormal Pap smear 2009    Past LEEP and colposcopy     DVT (deep venous thrombosis) (H) 2005    at time of bunionectomy, bilateral.     History of colonic polyps 2016    Removed at colonoscopy     IBS (irritable bowel syndrome)      Raynaud's disease 2005      Past Surgical History:   Procedure Laterality Date     APPENDECTOMY  2002    ruptured     COLONOSCOPY  2016     EYE SURGERY  3/2017     PRK     ORTHOPEDIC SURGERY  Bunionectomy    2005? Deep vein thrombosis       ROS:  Review of Systems     Constitutional:  Negative for fever, chills, weight loss, weight gain, fatigue, decreased appetite, night sweats, recent stressors, height gain, height loss, post-operative complications, incisional pain, hallucinations, increased energy, hyperactivity and confused.   HENT:  Negative for ear pain, hearing loss, tinnitus, nosebleeds, trouble swallowing, hoarse voice, mouth sores, sore throat, ear discharge, tooth pain, gum tenderness, taste disturbance, smell disturbance, hearing aid, bleeding gums, dry mouth, sinus pain, sinus congestion and neck mass.    Eyes:  Negative for double vision, pain, redness, eye pain, decreased vision, eye watering, eye bulging, eye dryness, flashing lights, spots, floaters, strabismus, tunnel vision, jaundice and eye irritation.   Respiratory:   Negative for cough, hemoptysis, sputum production, shortness of breath, wheezing, sleep disturbances due to breathing, snores loudly, respiratory pain, dyspnea on exertion, cough disturbing sleep and postural dyspnea.    Cardiovascular:  Negative for chest pain, dyspnea on exertion, palpitations, orthopnea, claudication, leg swelling, fingers/toes turn blue, hypertension, hypotension, syncope, history of heart murmur, chest pain on exertion, chest pain at rest, pacemaker, few scattered varicosities, leg pain, sleep disturbances due to breathing, tachycardia, light-headedness, exercise intolerance and edema.   Gastrointestinal:  Negative for heartburn, nausea, vomiting, abdominal pain, diarrhea, constipation, blood in stool, melena, rectal pain, bloating, hemorrhoids, bowel incontinence, jaundice, rectal bleeding, coffee ground emesis and change in stool.   Genitourinary:  Negative for bladder incontinence, dysuria, urgency, hematuria, flank pain, vaginal discharge, difficulty urinating, genital sores, dyspareunia, decreased libido, nocturia,  "voiding less frequently, arousal difficulty, abnormal vaginal bleeding, excessive menstruation, menstrual changes, hot flashes, vaginal dryness and postmenopausal bleeding.   Musculoskeletal:  Positive for back pain and neck pain.   Skin:  Negative for nail changes, itching, poor wound healing, rash, hair changes, skin changes, acne, warts, poor wound healing, scarring, flaky skin, Raynaud's phenomenon, sensitivity to sunlight and skin thickening.   Neurological:  Negative for dizziness, tingling, tremors, speech change, seizures, loss of consciousness, weakness, light-headedness, numbness, headaches, disturbances in coordination, extremity numbness, memory loss, difficulty walking and paralysis.   Endo/Heme:  Negative for anemia, swollen glands and bruises/bleeds easily.   Psychiatric/Behavioral:  Negative for depression, hallucinations, memory loss, decreased concentration, mood swings and panic attacks.    Breast:  Negative for breast discharge, breast mass, breast pain and nipple retraction.   Endocrine:  Negative for altered temperature regulation, polyphagia, polydipsia, unwanted hair growth and change in facial hair.        OBJECTIVE:   /86 (BP Location: Right arm, Patient Position: Sitting, Cuff Size: Adult Small)   Pulse 82   Ht 1.702 m (5' 7\")   Wt 62.5 kg (137 lb 12.8 oz)   SpO2 98%   BMI 21.58 kg/m    EXAM:  GENERAL: healthy, alert and no distress  EYES: Eyes grossly normal to inspection, PERRL and conjunctivae and sclerae normal  RESP: lungs clear to auscultation - no rales, rhonchi or wheezes  BREAST: normal without masses, tenderness or nipple discharge and no palpable axillary masses or adenopathy  CV: regular rate and rhythm, normal S1 S2, no S3 or S4, no murmur, click or rub, no peripheral edema and peripheral pulses strong  ABDOMEN: soft, nontender, no hepatosplenomegaly, no masses and bowel sounds normal  MS: no gross musculoskeletal defects noted, no edema  SKIN: no suspicious lesions " "or rashes  NEURO: Normal strength and tone, mentation intact and speech normal  PSYCH: mentation appears normal, affect normal/bright    Diagnostic Test Results:  Labs reviewed in Epic    ASSESSMENT/PLAN:       ICD-10-CM    1. Routine general medical examination at a health care facility  Z00.00       2. Encounter for screening mammogram for breast cancer  Z12.31 MA Screen Bilateral w/Sam      3. Acute left-sided low back pain without sciatica  M54.50 Physical Therapy Referral      4. Preventative health care  Z00.00 Lipid Profile     Basic Metabolic Panel     TSH with free T4 reflex      5. Need for vaccination  Z23 TDAP VACCINE (Adacel, Boostrix)  [[id number]]          Patient has been advised of split billing requirements and indicates understanding: Yes  COUNSELING:   Reviewed preventive health counseling, as reflected in patient instructions       Regular exercise       Healthy diet/nutrition    Estimated body mass index is 21.58 kg/m  as calculated from the following:    Height as of this encounter: 1.702 m (5' 7\").    Weight as of this encounter: 62.5 kg (137 lb 12.8 oz).        She reports that she has never smoked. She has never used smokeless tobacco.      Counseling Resources:  ATP IV Guidelines  Pooled Cohorts Equation Calculator  Breast Cancer Risk Calculator  BRCA-Related Cancer Risk Assessment: FHS-7 Tool  FRAX Risk Assessment  ICSI Preventive Guidelines  Dietary Guidelines for Americans, 2010  USDA's MyPlate  ASA Prophylaxis  Lung CA Screening    Sandra Morales MD  [facility] INTERNAL MEDICINE [place]    "

## 2023-04-04 ENCOUNTER — THERAPY VISIT (OUTPATIENT)
Dept: PHYSICAL THERAPY | Facility: CLINIC | Age: 58
End: 2023-04-04
Attending: INTERNAL MEDICINE
Payer: COMMERCIAL

## 2023-04-04 DIAGNOSIS — M54.50 ACUTE LEFT-SIDED LOW BACK PAIN WITHOUT SCIATICA: ICD-10-CM

## 2023-04-04 PROCEDURE — 97110 THERAPEUTIC EXERCISES: CPT | Mod: GP

## 2023-04-04 NOTE — PROGRESS NOTES
Physical Therapy Initial Evaluation  4/4/2023     Precautions/Restrictions/MD instructions: eval and treat    Therapist Assessment: Shi Baker is a 57 year old female patient presenting to Physical Therapy with acute low back pain. Patient demonstrates limited lumbar extension, no radicular signs, glute weakness and good response to extension based exercises. These impairments limit their ability to sit and sleep without pain. Skilled PT services are necessary in order to reduce impairments and improve independent function.    SUBJECTIVE    Chief Complaint: Low back pain  Associated symptoms: denies radicular symptoms  Paresthesia (yes/no):  no  Onset date: 1 month ago  Symptoms commenced as a result of: unknown   Pain severity: 4/10 current, 6/10 worst  Location of pain: Left low back, left hip  Quality of Pain: Throbbing   Better: standing, ice, stretching (twisting, piriformis)  Worse:  Sitting, sleeping  Progression of Symptoms since onset:  Since onset, these symptoms are getting worse  Previous treatment: has included none  Previous Functional Status:  fully functional prior to pain onset/injury.        General health as reported by patient: good.    PMH: migraines/headaches   Surgical history/trauma: bunionectomy  Imaging: none  Medications: anti-inflammatory     Occupation:  Job duties: computer work, prolonged sitting  Current exercise regimen/Recreational activities: walking <1 mile  Barriers to treatment: none        Patient's Goal(s): walk 3 miles     OBJECTIVE    Posture:   Sitting:  Sitting on edge of seat with posterior pelvic tilt  Standing:no significant findings    Dynamic Movement Screen  Single leg stance observations: Lateral trunk lean bilaterally  Double limb squat observations: Anterior knee translation        Movement Loss:   Carlos Mod Min Nil Pain   Flexion    x Good stretch   Extension   x  Good stretch   Side Gliding R   x     Side Gliding L   x       Neural  Tension:   Slump: negative B  SLR: negative B  Motor deficit:  5/5 LE myotomes        Hip ROM   Hip Flexion Hip ER Hip IR Hip Extension   Left wnl wnl wnl    Right wnl wnl wnl      Hip Strength   MMT Hip Abduction   Left 3/5   Right 3+/5         Tested Movements  Directional Preference: extension  Repeated Extension Test: decreased pain    Segmental joint mobility  Hypomobile L 4-5    ASSESSMENT/PLAN  Patient is a 57 year old female with lumbar complaints.    Patient has the following significant findings with corresponding treatment plan.                Diagnosis 1:  Acute low back pain    Pain -  hot/cold therapy, splint/taping/bracing/orthotics, self management, education, directional preference exercise and home program  Decreased ROM/flexibility - manual therapy, therapeutic exercise and home program  Decreased joint mobility - manual therapy, therapeutic exercise and home program  Decreased strength - therapeutic exercise, therapeutic activities and home program  Decreased proprioception - neuro re-education, therapeutic activities and home program  Impaired posture - neuro re-education and home program  Therapy Evaluation Codes:   1) History comprised of:   Personal factors that impact the plan of care:      None.    Comorbidity factors that impact the plan of care are:      Migraines/headaches.     Medications impacting care: Anti-inflammatory.  2) Examination of Body Systems comprised of:   Body structures and functions that impact the plan of care:      Lumbar spine.   Activity limitations that impact the plan of care are:      Bending, Sitting, Sleeping and Laying down.  3) Clinical presentation characteristics are:   Stable/Uncomplicated.  4) Decision-Making    Low complexity using standardized patient assessment instrument and/or measureable assessment of functional outcome.  Cumulative Therapy Evaluation is: Low complexity.    Previous and current functional limitations:  (See Goal Flow Sheet for this  information)    Short term and Long term goals: (See Goal Flow Sheet for this information)     Communication ability:  Patient appears to be able to clearly communicate and understand verbal and written communication and follow directions correctly.  Treatment Explanation - The following has been discussed with the patient:   RX ordered/plan of care  Anticipated outcomes  Possible risks and side effects  This patient would benefit from PT intervention to resume normal activities.   Rehab potential is good.    Frequency:  1 X week, once daily  Duration:  for 6-8 weeks  Discharge Plan:  Achieve all LTG.  Independent in home treatment program.  Reach maximal therapeutic benefit.    Please refer to the daily flowsheet for treatment today, total treatment time and time spent performing 1:1 timed codes.     Answers for HPI/ROS submitted by the patient on 4/4/2023  Reason for Visit:: PT for lower left back pain  How problem occurred:: Bot sure  Number scale: 4/10  General health as reported by patient: good  Please check all that apply to your current or past medical history: migraines/headaches  Medical allergies: latex  Surgeries: other  Other Surgery Detail: Apendectomy, bunionectomy  Medications you are currently taking: anti-inflammatory, pain medication  Occupation::   What are your primary job tasks: computer work, prolonged sitting

## 2023-04-11 ENCOUNTER — THERAPY VISIT (OUTPATIENT)
Dept: PHYSICAL THERAPY | Facility: CLINIC | Age: 58
End: 2023-04-11
Payer: COMMERCIAL

## 2023-04-11 DIAGNOSIS — M54.50 ACUTE LEFT-SIDED LOW BACK PAIN WITHOUT SCIATICA: Primary | ICD-10-CM

## 2023-04-11 PROCEDURE — 97110 THERAPEUTIC EXERCISES: CPT | Mod: GP

## 2023-04-11 PROCEDURE — 97140 MANUAL THERAPY 1/> REGIONS: CPT | Mod: GP

## 2023-04-12 ENCOUNTER — ANCILLARY PROCEDURE (OUTPATIENT)
Dept: MAMMOGRAPHY | Facility: CLINIC | Age: 58
End: 2023-04-12
Attending: INTERNAL MEDICINE
Payer: COMMERCIAL

## 2023-04-12 DIAGNOSIS — Z12.31 ENCOUNTER FOR SCREENING MAMMOGRAM FOR BREAST CANCER: ICD-10-CM

## 2023-04-12 PROCEDURE — 77067 SCR MAMMO BI INCL CAD: CPT | Mod: GC | Performed by: RADIOLOGY

## 2023-04-12 PROCEDURE — 77063 BREAST TOMOSYNTHESIS BI: CPT | Mod: GC | Performed by: RADIOLOGY

## 2023-04-25 ENCOUNTER — THERAPY VISIT (OUTPATIENT)
Dept: PHYSICAL THERAPY | Facility: CLINIC | Age: 58
End: 2023-04-25
Payer: COMMERCIAL

## 2023-04-25 DIAGNOSIS — M54.50 ACUTE LEFT-SIDED LOW BACK PAIN WITHOUT SCIATICA: Primary | ICD-10-CM

## 2023-04-25 PROCEDURE — 97110 THERAPEUTIC EXERCISES: CPT | Mod: GP

## 2023-04-25 NOTE — PROGRESS NOTES
DISCHARGE REPORT    Progress reporting period is from initial eval to 4/25/23.       SUBJECTIVE  Subjective changes noted by patient: Continues to feel better. Will have pain after sitting for too long, after an hour or so.    Current pain level is 0/10  .     Initial Pain level: 6/10 (worst).   Changes in function:  Yes (See Goal flowsheet attached for changes in current functional level)  Adverse reaction to treatment or activity: None    OBJECTIVE  Changes noted in objective findings:  Yes, see below.  Objective: Lumbar ROM WNL - pain free. Good tolerance to progression of strengthening exercises without pain. Feels confident with IND management of symptoms and HEP.     ASSESSMENT/PLAN  Updated problem list and treatment plan: Diagnosis 1:  LBP  Pain -  home program  Decreased ROM/flexibility - home program  Decreased strength - home program  Decreased function - home program  STG/LTGs have been met or progress has been made towards goals:  Yes (See Goal flow sheet completed today.)  Assessment of Progress: The patient's condition is improving.  Self Management Plans:  Patient has been instructed in a home treatment program.  Patient is independent in a home treatment program.  I have re-evaluated this patient and find that the nature, scope, duration and intensity of the therapy is appropriate for the medical condition of the patient.  Shi continues to require the following intervention to meet STG and LTG's:  PT intervention is no longer required to meet STG/LTG.    Recommendations:  This patient is ready to be discharged from therapy and continue their home treatment program.    Please refer to the daily flowsheet for treatment today, total treatment time and time spent performing 1:1 timed codes.

## 2024-02-05 ENCOUNTER — PATIENT OUTREACH (OUTPATIENT)
Dept: GASTROENTEROLOGY | Facility: CLINIC | Age: 59
End: 2024-02-05
Payer: COMMERCIAL

## 2024-04-24 ENCOUNTER — ANCILLARY PROCEDURE (OUTPATIENT)
Dept: MAMMOGRAPHY | Facility: CLINIC | Age: 59
End: 2024-04-24
Attending: INTERNAL MEDICINE
Payer: COMMERCIAL

## 2024-04-24 DIAGNOSIS — Z12.31 VISIT FOR SCREENING MAMMOGRAM: ICD-10-CM

## 2024-04-24 PROCEDURE — 77067 SCR MAMMO BI INCL CAD: CPT | Mod: GC

## 2024-04-24 PROCEDURE — 77063 BREAST TOMOSYNTHESIS BI: CPT | Mod: GC

## 2024-06-02 ENCOUNTER — HEALTH MAINTENANCE LETTER (OUTPATIENT)
Age: 59
End: 2024-06-02

## 2024-06-25 ENCOUNTER — OFFICE VISIT (OUTPATIENT)
Dept: DERMATOLOGY | Facility: CLINIC | Age: 59
End: 2024-06-25
Payer: COMMERCIAL

## 2024-06-25 DIAGNOSIS — Z12.83 SKIN CANCER SCREENING: Primary | ICD-10-CM

## 2024-06-25 DIAGNOSIS — L91.8 INFLAMED ACROCHORDON: ICD-10-CM

## 2024-06-25 DIAGNOSIS — D22.9 MULTIPLE MELANOCYTIC NEVI: ICD-10-CM

## 2024-06-25 PROCEDURE — 11200 RMVL SKIN TAGS UP TO&INC 15: CPT | Performed by: DERMATOLOGY

## 2024-06-25 PROCEDURE — 99203 OFFICE O/P NEW LOW 30 MIN: CPT | Mod: 25 | Performed by: DERMATOLOGY

## 2024-06-25 ASSESSMENT — PAIN SCALES - GENERAL: PAINLEVEL: NO PAIN (0)

## 2024-06-25 NOTE — LETTER
6/25/2024       RE: Shi Baker  [address]  [address]  [facility] [address]     Dear Colleague,    Thank you for referring your patient, Shi Baker, to the [facility] [place] [facility]. Please see a copy of my visit note below.    Dermatology New Patient Visit  June 25, 2024    ASSESSMENT AND PLAN:     1.  History of lichenoid keratosis of right shoulder, biopsied in 2019.  - Reassurance as to benign nature  2.  History of halo nevi.  Clinically, there is no evidence of recurrence today.   3.  Scattered, benign-appearing nevi and solar lentigines.  Reassurance was provided as to the benign nature of these lesions.   4. Inflamed acrochordon of right hip.  Catches on clothing.  After informed verbal consent, treated one lesion with LN2 x 5 seconds x 2 cycles.  Patient tolerated without complication.  4.  She will follow up in our clinic in 1-2 years' time.         Yosi Jeronimo MD  Dermatology Attending    _____________________    Chief complaint: Skin check and irritated spot on right hip    History of present illness: \  Shi is a very pleasant 58-year-old female who is a new patient to our clinic today, presenting for a skin check.  She was seen in our dermatology clinic in 2019, at which time we performed a biopsy on her shoulder which demonstrated features of a lichenoid keratosis.  She has no history of skin cancer.  Today she notes a somewhat bothersome spot on her right hip which catches on clothing, but no obviously new or changing moles and no areas of nonhealing sores.    PHYSICAL EXAMINATION:   GENERAL:  This is a well-appearing, well-nourished female with a normal mood and affect who is oriented x3.   SKIN:  A cutaneous exam of the head, neck, chest, abdomen, back, bilateral upper and lower extremities and axillae was performed.  On the back, there are rare, oval, white, flat-topped papules consistent with scars  from prior biopsies.  No recurrent pigment is present in these areas.  On the right shoulder above the clavicle, there is a well healed scar with no atypical feartures.   On the abdomen, there is a 2 mm, domed, flesh-toned papule on the right upper quadrant consistent with a benign nevus.  On the right hip there is a filiform flesh toned papule with a pink halo.  The rest of her skin check demonstrates scattered, benign-appearing solar lentigines and no other atypical moles or areas worrisome for nonmelanoma skin cancer.

## 2024-06-25 NOTE — PROGRESS NOTES
Dermatology New Patient Visit  June 25, 2024    ASSESSMENT AND PLAN:     1.  History of lichenoid keratosis of right shoulder, biopsied in 2019.  - Reassurance as to benign nature  2.  History of halo nevi.  Clinically, there is no evidence of recurrence today.   3.  Scattered, benign-appearing nevi and solar lentigines.  Reassurance was provided as to the benign nature of these lesions.   4. Inflamed acrochordon of right hip.  Catches on clothing.  After informed verbal consent, treated one lesion with LN2 x 5 seconds x 2 cycles.  Patient tolerated without complication.  4.  She will follow up in our clinic in 1-2 years' time.         Yosi Jeronimo MD  Dermatology Attending    _____________________    Chief complaint: Skin check and irritated spot on right hip    History of present illness: \  Shi is a very pleasant 58-year-old female who is a new patient to our clinic today, presenting for a skin check.  She was seen in our dermatology clinic in 2019, at which time we performed a biopsy on her shoulder which demonstrated features of a lichenoid keratosis.  She has no history of skin cancer.  Today she notes a somewhat bothersome spot on her right hip which catches on clothing, but no obviously new or changing moles and no areas of nonhealing sores.    PHYSICAL EXAMINATION:   GENERAL:  This is a well-appearing, well-nourished female with a normal mood and affect who is oriented x3.   SKIN:  A cutaneous exam of the head, neck, chest, abdomen, back, bilateral upper and lower extremities and axillae was performed.  On the back, there are rare, oval, white, flat-topped papules consistent with scars from prior biopsies.  No recurrent pigment is present in these areas.  On the right shoulder above the clavicle, there is a well healed scar with no atypical feartures.   On the abdomen, there is a 2 mm, domed, flesh-toned papule on the right upper quadrant consistent with a benign nevus.  On the right hip there is a  filiform flesh toned papule with a pink halo.  The rest of her skin check demonstrates scattered, benign-appearing solar lentigines and no other atypical moles or areas worrisome for nonmelanoma skin cancer.

## 2024-06-25 NOTE — NURSING NOTE
Chief Complaint   Patient presents with    Skin Check     Patient reports lesion of concern on their forehead. The patient reports first noticing the site one year ago. The patient would like a FBSE.      Marian Jerez LPN

## 2024-07-22 ENCOUNTER — TRANSCRIBE ORDERS (OUTPATIENT)
Dept: OTHER | Age: 59
End: 2024-07-22

## 2024-07-22 DIAGNOSIS — Z80.9 FAMILY HISTORY OF CANCER: Primary | ICD-10-CM

## 2024-07-22 DIAGNOSIS — Z91.89 AT HIGH RISK FOR BREAST CANCER: ICD-10-CM

## 2024-07-23 ENCOUNTER — PATIENT OUTREACH (OUTPATIENT)
Dept: ONCOLOGY | Facility: CLINIC | Age: 59
End: 2024-07-23
Payer: COMMERCIAL

## 2024-07-23 SDOH — HEALTH STABILITY: PHYSICAL HEALTH: ON AVERAGE, HOW MANY MINUTES DO YOU ENGAGE IN EXERCISE AT THIS LEVEL?: 60 MIN

## 2024-07-23 SDOH — HEALTH STABILITY: PHYSICAL HEALTH: ON AVERAGE, HOW MANY DAYS PER WEEK DO YOU ENGAGE IN MODERATE TO STRENUOUS EXERCISE (LIKE A BRISK WALK)?: 6 DAYS

## 2024-07-23 ASSESSMENT — SOCIAL DETERMINANTS OF HEALTH (SDOH): HOW OFTEN DO YOU GET TOGETHER WITH FRIENDS OR RELATIVES?: MORE THAN THREE TIMES A WEEK

## 2024-07-23 NOTE — PROGRESS NOTES
Writer received Cancer Risk Management Program referral, referred for:    Self referral for genetic counseling for family history of cancer; high risk mammogram.     Reviewed for appropriate plan, and sent to New Patient Scheduling for completion.

## 2024-07-27 ENCOUNTER — LAB (OUTPATIENT)
Dept: LAB | Facility: CLINIC | Age: 59
End: 2024-07-27
Payer: COMMERCIAL

## 2024-07-27 ENCOUNTER — OFFICE VISIT (OUTPATIENT)
Dept: INTERNAL MEDICINE | Facility: CLINIC | Age: 59
End: 2024-07-27
Payer: COMMERCIAL

## 2024-07-27 VITALS
WEIGHT: 135.5 LBS | OXYGEN SATURATION: 97 % | BODY MASS INDEX: 21.27 KG/M2 | HEIGHT: 67 IN | HEART RATE: 67 BPM | SYSTOLIC BLOOD PRESSURE: 106 MMHG | DIASTOLIC BLOOD PRESSURE: 71 MMHG

## 2024-07-27 DIAGNOSIS — M54.50 CHRONIC LEFT-SIDED LOW BACK PAIN WITHOUT SCIATICA: ICD-10-CM

## 2024-07-27 DIAGNOSIS — M85.89 OSTEOPENIA OF MULTIPLE SITES: ICD-10-CM

## 2024-07-27 DIAGNOSIS — G89.29 CHRONIC LEFT-SIDED LOW BACK PAIN WITHOUT SCIATICA: ICD-10-CM

## 2024-07-27 DIAGNOSIS — Z00.00 PREVENTATIVE HEALTH CARE: ICD-10-CM

## 2024-07-27 DIAGNOSIS — Z00.00 PREVENTATIVE HEALTH CARE: Primary | ICD-10-CM

## 2024-07-27 LAB
ALBUMIN SERPL BCG-MCNC: 4.7 G/DL (ref 3.5–5.2)
ALP SERPL-CCNC: 82 U/L (ref 40–150)
ALT SERPL W P-5'-P-CCNC: 18 U/L (ref 0–50)
ANION GAP SERPL CALCULATED.3IONS-SCNC: 11 MMOL/L (ref 7–15)
AST SERPL W P-5'-P-CCNC: 30 U/L (ref 0–45)
BILIRUB SERPL-MCNC: 0.5 MG/DL
BUN SERPL-MCNC: 8.2 MG/DL (ref 6–20)
CALCIUM SERPL-MCNC: 10 MG/DL (ref 8.8–10.4)
CHLORIDE SERPL-SCNC: 103 MMOL/L (ref 98–107)
CHOLEST SERPL-MCNC: 215 MG/DL
CREAT SERPL-MCNC: 0.77 MG/DL (ref 0.51–0.95)
EGFRCR SERPLBLD CKD-EPI 2021: 89 ML/MIN/1.73M2
FASTING STATUS PATIENT QL REPORTED: YES
FASTING STATUS PATIENT QL REPORTED: YES
GLUCOSE SERPL-MCNC: 92 MG/DL (ref 70–99)
HCO3 SERPL-SCNC: 27 MMOL/L (ref 22–29)
HDLC SERPL-MCNC: 118 MG/DL
LDLC SERPL CALC-MCNC: 85 MG/DL
NONHDLC SERPL-MCNC: 97 MG/DL
POTASSIUM SERPL-SCNC: 4.5 MMOL/L (ref 3.4–5.3)
PROT SERPL-MCNC: 7.5 G/DL (ref 6.4–8.3)
SODIUM SERPL-SCNC: 141 MMOL/L (ref 135–145)
TRIGL SERPL-MCNC: 58 MG/DL
TSH SERPL DL<=0.005 MIU/L-ACNC: 1.13 UIU/ML (ref 0.3–4.2)

## 2024-07-27 PROCEDURE — 84443 ASSAY THYROID STIM HORMONE: CPT | Performed by: PATHOLOGY

## 2024-07-27 PROCEDURE — 80053 COMPREHEN METABOLIC PANEL: CPT | Performed by: PATHOLOGY

## 2024-07-27 PROCEDURE — 80061 LIPID PANEL: CPT | Performed by: PATHOLOGY

## 2024-07-27 PROCEDURE — 36415 COLL VENOUS BLD VENIPUNCTURE: CPT | Performed by: PATHOLOGY

## 2024-07-27 PROCEDURE — 99396 PREV VISIT EST AGE 40-64: CPT | Performed by: INTERNAL MEDICINE

## 2024-07-27 ASSESSMENT — PAIN SCALES - GENERAL: PAINLEVEL: NO PAIN (0)

## 2024-07-27 NOTE — NURSING NOTE
Chief Complaint   Patient presents with    Physical     Here for annual visit. Would like to discuss update on back pain and nodules on fingers.       Marian Guzman CMA, EMT at 8:53 AM on 7/27/2024.

## 2024-07-27 NOTE — PROGRESS NOTES
Preventive Care Visit  [facility]  Sandra Morales MD, Internal Medicine  Jul 27, 2024      Assessment & Plan     Preventative health care  Discussed preventive healthcare needs with patient.  Recommend COVID-vaccine in the fall.  Patient is up-to-date on Pap smear.  - Comprehensive metabolic panel; Future  - TSH with free T4 reflex; Future  - Lipid panel reflex to direct LDL Fasting; Future    Osteopenia of multiple sites  Reviewed recent DEXA scan with patient.  Recommend repeating the test as the recommendation was to follow-up in 3 years.  May consider additional testing and interventions depending on the bone density results.  - DX Bone Density; Future    Chronic left-sided low back pain without sciatica  Patient likely has musculoskeletal etiology of pain.  However, given persistent symptoms recommend further evaluation.  Referral to sports medicine clinic was provided.  - Orthopedic  Referral; Future            Counseling  Appropriate preventive services were addressed with this patient via screening, questionnaire, or discussion as appropriate for fall prevention, nutrition, physical activity, Tobacco-use cessation, weight loss and cognition.  Checklist reviewing preventive services available has been given to the patient.  Reviewed patient's diet, addressing concerns and/or questions.   She is at risk for psychosocial distress and has been provided with information to reduce risk.           No follow-ups on file.    Subjective   Shi is a 58 year old, presenting for the following:  Physical (Here for annual visit. Would like to discuss update on back pain and nodules on fingers.)         Health Care Directive  Patient does not have a Health Care Directive or Living Will: Advance Directive received and scanned. Click on Code in the patient header to view.    HPI    Patient is here for a preventive visit. She states that she is scheduled for a visit  with genetic counselor to discuss breast cancer risk. She is wondering if she needs to have a bone density test.   Patient reports that she is still having left-sided back pain. She was seen by PT and has been doing exercises on the regular basis. She rates the discomfort as variable from 2/10 to 8/10. Pain is radiating to the left thigh. She has some pain in the thigh as well.         7/23/2024   General Health   How would you rate your overall physical health? Good   Feel stress (tense, anxious, or unable to sleep) Only a little      (!) STRESS CONCERN      7/23/2024   Nutrition   Three or more servings of calcium each day? Yes   Diet: Vegetarian/vegan   How many servings of fruit and vegetables per day? 4 or more   How many sweetened beverages each day? 0-1            7/23/2024   Exercise   Days per week of moderate/strenous exercise 6 days   Average minutes spent exercising at this level 60 min            7/23/2024   Social Factors   Frequency of gathering with friends or relatives More than three times a week   Worry food won't last until get money to buy more No   Food not last or not have enough money for food? No   Do you have housing? (Housing is defined as stable permanent housing and does not include staying ouside in a car, in a tent, in an abandoned building, in an overnight shelter, or couch-surfing.) Yes   Are you worried about losing your housing? No   Lack of transportation? No   Unable to get utilities (heat,electricity)? No            7/23/2024   Fall Risk   Fallen 2 or more times in the past year? No   Trouble with walking or balance? No             7/23/2024   Dental   Dentist two times every year? Yes            7/23/2024   TB Screening   Were you born outside of the [place]? No            Today's PHQ-2 Score:       7/27/2024     8:39 AM   PHQ-2 ( 1999 [employer])   Q1: Little interest or pleasure in doing things 0   Q2: Feeling down, depressed or hopeless 0   PHQ-2 Score 0   Q1: Little interest or  pleasure in doing things Not at all   Q2: Feeling down, depressed or hopeless Not at all   PHQ-2 Score 0           7/23/2024   Substance Use   Alcohol more than 3/day or more than 7/wk No   Do you use any other substances recreationally? (!) CANNABIS PRODUCTS        Social History     Tobacco Use    Smoking status: Never    Smokeless tobacco: Never   Substance Use Topics    Alcohol use: Yes     Alcohol/week: 1.0 standard drink of alcohol     Types: 1 Standard drinks or equivalent per week     Comment: Occasional    Drug use: No           4/24/2024   LAST FHS-7 RESULTS   1st degree relative breast or ovarian cancer Yes   Any relative bilateral breast cancer No   Any male have breast cancer Unknown   Any ONE woman have BOTH breast AND ovarian cancer Unknown   Any woman with breast cancer before 50yrs Unknown   2 or more relatives with breast AND/OR ovarian cancer Unknown   2 or more relatives with breast AND/OR bowel cancer Unknown           Mammogram Screening - Mammogram every 1-2 years updated in Health Maintenance based on mutual decision making        7/23/2024   STI Screening   New sexual partner(s) since last STI/HIV test? No        History of abnormal Pap smear: No - age 30- 64 PAP with HPV every 5 years recommended        Latest Ref Rng & Units 11/4/2020     4:41 PM 11/4/2020     4:30 PM   PAP / HPV   PAP (Historical)  NIL     HPV 16 DNA NEG^Negative  Negative    HPV 18 DNA NEG^Negative  Negative    Other HR HPV NEG^Negative  Negative      ASCVD Risk   The ASCVD Risk score (Fly WHITMAN, et al., 2019) failed to calculate for the following reasons:    The valid HDL cholesterol range is 20 to 100 mg/dL           Reviewed and updated as needed this visit by Provider                    Past Medical History:   Diagnosis Date    Abnormal Pap smear 2009    Past LEEP and colposcopy    DVT (deep venous thrombosis) (H) 2005    at time of bunionectomy, bilateral.    History of colonic polyps 2016    Removed at  "colonoscopy    IBS (irritable bowel syndrome)     Raynaud's disease 2005     Past Surgical History:   Procedure Laterality Date    APPENDECTOMY  2002    ruptured    COLONOSCOPY  2016    EYE SURGERY  3/2017    PRK    ORTHOPEDIC SURGERY  Bunionectomy    2005? Deep vein thrombosis            Objective    Exam  /71 (BP Location: Right arm, Patient Position: Sitting, Cuff Size: Adult Regular)   Pulse 67   Ht 1.702 m (5' 7\")   Wt 61.5 kg (135 lb 8 oz)   SpO2 97%   BMI 21.22 kg/m     Estimated body mass index is 21.22 kg/m  as calculated from the following:    Height as of this encounter: 1.702 m (5' 7\").    Weight as of this encounter: 61.5 kg (135 lb 8 oz).    Physical Exam  GENERAL: alert and no distress  EYES: Eyes grossly normal to inspection, PERRL and conjunctivae and sclerae normal  NECK: no adenopathy, no asymmetry, masses, or scars  RESP: lungs clear to auscultation - no rales, rhonchi or wheezes  CV: regular rates and rhythm and no peripheral edema  ABDOMEN: soft, nontender and bowel sounds normal  MS: no gross musculoskeletal defects noted, no edema  SKIN: no suspicious lesions or rashes  NEURO: Normal strength and tone, mentation intact and speech normal  BACK: no CVA tenderness, no paralumbar tenderness        Signed Electronically by: Sandra Morales MD    "

## 2024-07-30 PROBLEM — L91.8 INFLAMED ACROCHORDON: Status: ACTIVE | Noted: 2024-07-30

## 2024-07-30 PROBLEM — Z12.83 SKIN CANCER SCREENING: Status: ACTIVE | Noted: 2024-07-30

## 2024-08-22 ENCOUNTER — ANCILLARY PROCEDURE (OUTPATIENT)
Dept: BONE DENSITY | Facility: CLINIC | Age: 59
End: 2024-08-22
Attending: INTERNAL MEDICINE
Payer: COMMERCIAL

## 2024-08-22 DIAGNOSIS — M85.89 OSTEOPENIA OF MULTIPLE SITES: ICD-10-CM

## 2024-08-22 PROCEDURE — 77080 DXA BONE DENSITY AXIAL: CPT | Performed by: INTERNAL MEDICINE

## 2024-09-05 ENCOUNTER — OFFICE VISIT (OUTPATIENT)
Dept: PHYSICAL MEDICINE AND REHAB | Facility: CLINIC | Age: 59
End: 2024-09-05
Attending: INTERNAL MEDICINE
Payer: COMMERCIAL

## 2024-09-05 VITALS
OXYGEN SATURATION: 99 % | SYSTOLIC BLOOD PRESSURE: 105 MMHG | DIASTOLIC BLOOD PRESSURE: 71 MMHG | HEART RATE: 89 BPM | RESPIRATION RATE: 16 BRPM

## 2024-09-05 DIAGNOSIS — M79.18 MYALGIA, OTHER SITE: ICD-10-CM

## 2024-09-05 DIAGNOSIS — M54.50 CHRONIC LEFT-SIDED LOW BACK PAIN WITHOUT SCIATICA: ICD-10-CM

## 2024-09-05 DIAGNOSIS — M70.62 TROCHANTERIC BURSITIS OF LEFT HIP: ICD-10-CM

## 2024-09-05 DIAGNOSIS — M47.812 CERVICAL SPONDYLOSIS WITHOUT MYELOPATHY: ICD-10-CM

## 2024-09-05 DIAGNOSIS — M53.3 DISORDER OF SACRUM: ICD-10-CM

## 2024-09-05 DIAGNOSIS — M47.817 LUMBOSACRAL SPONDYLOSIS WITHOUT MYELOPATHY: Primary | ICD-10-CM

## 2024-09-05 DIAGNOSIS — G89.29 CHRONIC LEFT-SIDED LOW BACK PAIN WITHOUT SCIATICA: ICD-10-CM

## 2024-09-05 PROCEDURE — 99205 OFFICE O/P NEW HI 60 MIN: CPT | Performed by: PHYSICAL MEDICINE & REHABILITATION

## 2024-09-05 RX ORDER — TIZANIDINE 2 MG/1
2 TABLET ORAL 3 TIMES DAILY
Qty: 90 TABLET | Refills: 0 | Status: SHIPPED | OUTPATIENT
Start: 2024-09-05

## 2024-09-05 RX ORDER — MELOXICAM 15 MG/1
15 TABLET ORAL DAILY
Qty: 30 TABLET | Refills: 1 | Status: SHIPPED | OUTPATIENT
Start: 2024-09-05

## 2024-09-05 NOTE — LETTER
9/5/2024       RE: Shi Baker  [address]  [address]  [facility] [address]     Dear Colleague,    Thank you for referring your patient, Shi Baker, to the [facility] [place] at [facility]. Please see a copy of my visit note below.    CC: I am seeing this patient with an known history of headache and neck pain as well as lower back and hip pain for treatment.    Patient is a pleasant 58-year-old woman who reports having neck pain with headaches for the past several years.  She is managing this by prophylactically taking Excedrin before that pain becomes worse.  She has previously had x-rays taken which showed some multilevel facet arthropathy.  She has also done physical therapy and carries out exercise program.    Her more current issue is with reference to her lower back and the hip.  Left side is worse than the right side.  She finds sitting for any length of time painful.  She often wakes up with not much pain.  However as the day advances the pain worsens.  At worst it is an 8 and on average it is in the 4-5 range.  She does not have any radicular symptoms.  She denies any bowel bladder disturbance.  There is no history of trauma or motor vehicle accident.  She is hoping for some improvement.  She finds her neck cracks and she gets migraine with nausea.      Exam: XR CERVICAL SPINE 2/3 VWS, 12/27/2021 8:45 AM     Indication: R neck pain with radiculopathy x 6 months; Neck pain     Comparison: None     Findings:   AP and lateral views of the cervical spine.  C1-C7 are visualized on  lateral image. Straightening of the cervical lordosis. No significant  loss of intervertebral disc heights. Multilevel facet arthropathy,  most pronounced at C6-7. Uncinate spurring. The prevertebral soft  tissues are unremarkable.                                                                      Impression:   No acute  fracture or subluxation. Mild cervical spondylosis.     I have personally reviewed the examination and initial interpretation  and I agree with the findings.     DOMINICK ELLIS MD        Past Medical History:   Diagnosis Date     Abnormal Pap smear 2009    Past LEEP and colposcopy     DVT (deep venous thrombosis) (H) 2005    at time of bunionectomy, bilateral.     History of colonic polyps 2016    Removed at colonoscopy     IBS (irritable bowel syndrome)      Raynaud's disease 2005     Past Surgical History:   Procedure Laterality Date     APPENDECTOMY  2002    ruptured     COLONOSCOPY  2016     EYE SURGERY  3/2017    PRK     ORTHOPEDIC SURGERY  Bunionectomy    2005? Deep vein thrombosis     Family History       Problem (# of Occurrences) Relation (Name,Age of Onset)    Psoriasis (2) Son (1): 27 2015, difficult to treat, Son (2): 21 2015, easier to treat    Ovarian Cancer (1) Mother (Biological mother): neg genetic testing           Negative family history of: Unknown/Adopted, Skin Cancer           Social History     Socioeconomic History     Marital status: Single     Spouse name: Not on file     Number of children: Not on file     Years of education: Not on file     Highest education level: Not on file   Occupational History     Not on file   Tobacco Use     Smoking status: Never     Smokeless tobacco: Never   Substance and Sexual Activity     Alcohol use: Yes     Alcohol/week: 1.0 standard drink of alcohol     Types: 1 Standard drinks or equivalent per week     Comment: Occasional     Drug use: No     Sexual activity: Yes     Partners: Male     Birth control/protection: I.U.D.     Comment: mirena   Other Topics Concern     Parent/sibling w/ CABG, MI or angioplasty before 65F 55M? Not Asked   Social History Narrative     Not on file     Social Determinants of Health     Financial Resource Strain: Low Risk  (7/23/2024)    Financial Resource Strain      Within the past 12 months, have you or your family members you  live with been unable to get utilities (heat, electricity) when it was really needed?: No   Food Insecurity: Low Risk  (7/23/2024)    Food Insecurity      Within the past 12 months, did you worry that your food would run out before you got money to buy more?: No      Within the past 12 months, did the food you bought just not last and you didn t have money to get more?: No   Transportation Needs: Low Risk  (7/23/2024)    Transportation Needs      Within the past 12 months, has lack of transportation kept you from medical appointments, getting your medicines, non-medical meetings or appointments, work, or from getting things that you need?: No   Physical Activity: Sufficiently Active (7/23/2024)    Exercise Vital Sign      Days of Exercise per Week: 6 days      Minutes of Exercise per Session: 60 min   Stress: No Stress Concern Present (7/23/2024)    [employer] - Occupational Stress Questionnaire      Feeling of Stress : Only a little   Social Connections: Unknown (7/23/2024)    Social Connection and Isolation Panel [NHANES]      Frequency of Communication with Friends and Family: Not on file      Frequency of Social Gatherings with Friends and Family: More than three times a week      Attends Christian Services: Not on file      Active Member of Clubs or Organizations: Not on file      Attends Club or Organization Meetings: Not on file      Marital Status: Not on file   Interpersonal Safety: Not on file   Housing Stability: Low Risk  (7/23/2024)    Housing Stability      Do you have housing? : Yes      Are you worried about losing your housing?: No     Current Outpatient Medications   Medication Sig Dispense Refill     Vitamin D, Cholecalciferol, 1000 units TABS        /71 (BP Location: Right arm, Patient Position: Sitting, Cuff Size: Adult Regular)   Pulse 89   Resp 16   SpO2 99%      On examination, patient is alert and cooperative.  Vitals are stable.  She is  afebrile.    HEENT is negative.  Extraocular movements are intact.  Face is symmetric.  Tongue is midline.  Neck is supple.  Neck range of motion is functional.    She is able to move her upper extremities functionally.  She is able to carry her straight leg raising test bilaterally.  Noel's positive with left worse than the right.    In a prone position, she is tender over the cervical facets bilaterally left worse than the right.  She is also tender over the lumbar spine on the left side the left SI joint and the left piriformis are also tender.  The left trochanter is also tender.    Neurologically, her strength is full.  Sensation is intact.  Reflexes are symmetric and plantars are downgoing.  Coordination tests are intact.  Romberg is negative.  Gait is unremarkable.    Impression: At this point this 58-year-old woman with chronic headaches and neck pain as well as low back and hip pain extending up to the knee has a known history of cervical spondylosis.  I suspect she has issues related to lumbar spondylosis and involvement of the SI joint, piriformis and trochanteric bursa on the left side resulting in her current symptoms.  She does have exercise routine that she is carrying out she has seen physical therapist in the past.  This was in 2023.  I am going to get MRI of the cervical and lumbar spine to exclude other issues.  Will start physical therapy once a week for 4 weeks.  I am going to start her on Mobic 15 mg daily with food.  Tizanidine 2 mg 3 times daily.  She can start at bedtime and take 1 in the morning and afternoon if she tolerates it.  If she is not improving with these interventions, injection therapy would be required.  I reviewed them at length.  All questions were answered to her satisfaction.    60 minutes spent for this visit, greater than 50% was for counseling on above-mentioned issues.    nAdrews Pierce MD         Again, thank you for allowing me to participate in the care of  your patient.      Sincerely,    Andrews Pierce MD

## 2024-09-05 NOTE — NURSING NOTE
Chief Complaint   Patient presents with    New Patient     Here for a consult, confirmed with patient     Dotty Nowak

## 2024-09-05 NOTE — PROGRESS NOTES
CC: I am seeing this patient with an known history of headache and neck pain as well as lower back and hip pain for treatment.    Patient is a pleasant 58-year-old woman who reports having neck pain with headaches for the past several years.  She is managing this by prophylactically taking Excedrin before that pain becomes worse.  She has previously had x-rays taken which showed some multilevel facet arthropathy.  She has also done physical therapy and carries out exercise program.    Her more current issue is with reference to her lower back and the hip.  Left side is worse than the right side.  She finds sitting for any length of time painful.  She often wakes up with not much pain.  However as the day advances the pain worsens.  At worst it is an 8 and on average it is in the 4-5 range.  She does not have any radicular symptoms.  She denies any bowel bladder disturbance.  There is no history of trauma or motor vehicle accident.  She is hoping for some improvement.  She finds her neck cracks and she gets migraine with nausea.      Exam: XR CERVICAL SPINE 2/3 VWS, 12/27/2021 8:45 AM     Indication: R neck pain with radiculopathy x 6 months; Neck pain     Comparison: None     Findings:   AP and lateral views of the cervical spine.  C1-C7 are visualized on  lateral image. Straightening of the cervical lordosis. No significant  loss of intervertebral disc heights. Multilevel facet arthropathy,  most pronounced at C6-7. Uncinate spurring. The prevertebral soft  tissues are unremarkable.                                                                      Impression:   No acute fracture or subluxation. Mild cervical spondylosis.     I have personally reviewed the examination and initial interpretation  and I agree with the findings.     DOMINICK ELLIS MD        Past Medical History:   Diagnosis Date    Abnormal Pap smear 2009    Past LEEP and colposcopy    DVT (deep venous thrombosis) (H) 2005    at time of  bunionectomy, bilateral.    History of colonic polyps 2016    Removed at colonoscopy    IBS (irritable bowel syndrome)     Raynaud's disease 2005     Past Surgical History:   Procedure Laterality Date    APPENDECTOMY  2002    ruptured    COLONOSCOPY  2016    EYE SURGERY  3/2017    PRK    ORTHOPEDIC SURGERY  Bunionectomy    2005? Deep vein thrombosis     Family History       Problem (# of Occurrences) Relation (Name,Age of Onset)    Psoriasis (2) Son (1): 27 2015, difficult to treat, Son (2): 21 2015, easier to treat    Ovarian Cancer (1) Mother (Biological mother): neg genetic testing           Negative family history of: Unknown/Adopted, Skin Cancer           Social History     Socioeconomic History    Marital status: Single     Spouse name: Not on file    Number of children: Not on file    Years of education: Not on file    Highest education level: Not on file   Occupational History    Not on file   Tobacco Use    Smoking status: Never    Smokeless tobacco: Never   Substance and Sexual Activity    Alcohol use: Yes     Alcohol/week: 1.0 standard drink of alcohol     Types: 1 Standard drinks or equivalent per week     Comment: Occasional    Drug use: No    Sexual activity: Yes     Partners: Male     Birth control/protection: I.U.D.     Comment: mirena   Other Topics Concern    Parent/sibling w/ CABG, MI or angioplasty before 65F 55M? Not Asked   Social History Narrative    Not on file     Social Determinants of Health     Financial Resource Strain: Low Risk  (7/23/2024)    Financial Resource Strain     Within the past 12 months, have you or your family members you live with been unable to get utilities (heat, electricity) when it was really needed?: No   Food Insecurity: Low Risk  (7/23/2024)    Food Insecurity     Within the past 12 months, did you worry that your food would run out before you got money to buy more?: No     Within the past 12 months, did the food you bought just not last and you didn t have money  to get more?: No   Transportation Needs: Low Risk  (7/23/2024)    Transportation Needs     Within the past 12 months, has lack of transportation kept you from medical appointments, getting your medicines, non-medical meetings or appointments, work, or from getting things that you need?: No   Physical Activity: Sufficiently Active (7/23/2024)    Exercise Vital Sign     Days of Exercise per Week: 6 days     Minutes of Exercise per Session: 60 min   Stress: No Stress Concern Present (7/23/2024)    [employer] [place] [employer] - Occupational Stress Questionnaire     Feeling of Stress : Only a little   Social Connections: Unknown (7/23/2024)    Social Connection and Isolation Panel [NHANES]     Frequency of Communication with Friends and Family: Not on file     Frequency of Social Gatherings with Friends and Family: More than three times a week     Attends Buddhism Services: Not on file     Active Member of Clubs or Organizations: Not on file     Attends Club or Organization Meetings: Not on file     Marital Status: Not on file   Interpersonal Safety: Not on file   Housing Stability: Low Risk  (7/23/2024)    Housing Stability     Do you have housing? : Yes     Are you worried about losing your housing?: No     Current Outpatient Medications   Medication Sig Dispense Refill    Vitamin D, Cholecalciferol, 1000 units TABS        /71 (BP Location: Right arm, Patient Position: Sitting, Cuff Size: Adult Regular)   Pulse 89   Resp 16   SpO2 99%      On examination, patient is alert and cooperative.  Vitals are stable.  She is afebrile.    HEENT is negative.  Extraocular movements are intact.  Face is symmetric.  Tongue is midline.  Neck is supple.  Neck range of motion is functional.    She is able to move her upper extremities functionally.  She is able to carry her straight leg raising test bilaterally.  Noel's positive with left worse than the right.    In a prone position, she is tender over the  cervical facets bilaterally left worse than the right.  She is also tender over the lumbar spine on the left side the left SI joint and the left piriformis are also tender.  The left trochanter is also tender.    Neurologically, her strength is full.  Sensation is intact.  Reflexes are symmetric and plantars are downgoing.  Coordination tests are intact.  Romberg is negative.  Gait is unremarkable.    Impression: At this point this 58-year-old woman with chronic headaches and neck pain as well as low back and hip pain extending up to the knee has a known history of cervical spondylosis.  I suspect she has issues related to lumbar spondylosis and involvement of the SI joint, piriformis and trochanteric bursa on the left side resulting in her current symptoms.  She does have exercise routine that she is carrying out she has seen physical therapist in the past.  This was in 2023.  I am going to get MRI of the cervical and lumbar spine to exclude other issues.  Will start physical therapy once a week for 4 weeks.  I am going to start her on Mobic 15 mg daily with food.  Tizanidine 2 mg 3 times daily.  She can start at bedtime and take 1 in the morning and afternoon if she tolerates it.  If she is not improving with these interventions, injection therapy would be required.  I reviewed them at length.  All questions were answered to her satisfaction.    60 minutes spent for this visit, greater than 50% was for counseling on above-mentioned issues.    Andrews Pierce MD

## 2024-09-30 ENCOUNTER — MYC MEDICAL ADVICE (OUTPATIENT)
Dept: PHYSICAL MEDICINE AND REHAB | Facility: CLINIC | Age: 59
End: 2024-09-30

## 2024-09-30 DIAGNOSIS — M47.817 LUMBOSACRAL SPONDYLOSIS WITHOUT MYELOPATHY: Primary | ICD-10-CM

## 2024-09-30 RX ORDER — DIAZEPAM 5 MG
5 TABLET ORAL EVERY 6 HOURS PRN
Qty: 2 TABLET | Refills: 0 | Status: SHIPPED | OUTPATIENT
Start: 2024-09-30

## 2024-09-30 NOTE — TELEPHONE ENCOUNTER
Notification sent to patient via My Chart:    ----- Message -----  From: Andrews Pierce MD  Sent: 9/30/2024   1:27 PM CDT  To: Ccei Montero RN  Subject: FW: MRI                                          Wrote order for JUDY Shannon, MR x1    Andrews Pierce MD

## 2024-10-02 ENCOUNTER — ANCILLARY PROCEDURE (OUTPATIENT)
Dept: MRI IMAGING | Facility: CLINIC | Age: 59
End: 2024-10-02
Attending: PHYSICAL MEDICINE & REHABILITATION
Payer: COMMERCIAL

## 2024-10-02 PROCEDURE — 72148 MRI LUMBAR SPINE W/O DYE: CPT | Performed by: RADIOLOGY

## 2024-10-02 PROCEDURE — 72141 MRI NECK SPINE W/O DYE: CPT | Performed by: RADIOLOGY

## 2024-10-15 ENCOUNTER — THERAPY VISIT (OUTPATIENT)
Dept: PHYSICAL THERAPY | Facility: CLINIC | Age: 59
End: 2024-10-15
Payer: COMMERCIAL

## 2024-10-15 DIAGNOSIS — G89.29 CHRONIC LEFT-SIDED LOW BACK PAIN WITHOUT SCIATICA: ICD-10-CM

## 2024-10-15 DIAGNOSIS — M47.812 CERVICAL SPONDYLOSIS WITHOUT MYELOPATHY: ICD-10-CM

## 2024-10-15 DIAGNOSIS — M53.3 DISORDER OF SACRUM: ICD-10-CM

## 2024-10-15 DIAGNOSIS — M79.18 MYALGIA, OTHER SITE: ICD-10-CM

## 2024-10-15 DIAGNOSIS — M70.62 TROCHANTERIC BURSITIS OF LEFT HIP: ICD-10-CM

## 2024-10-15 DIAGNOSIS — M54.50 CHRONIC LEFT-SIDED LOW BACK PAIN WITHOUT SCIATICA: ICD-10-CM

## 2024-10-15 DIAGNOSIS — M47.817 LUMBOSACRAL SPONDYLOSIS WITHOUT MYELOPATHY: ICD-10-CM

## 2024-10-15 PROCEDURE — 97161 PT EVAL LOW COMPLEX 20 MIN: CPT | Mod: GP

## 2024-10-15 PROCEDURE — 97110 THERAPEUTIC EXERCISES: CPT | Mod: GP

## 2024-10-15 PROCEDURE — 97112 NEUROMUSCULAR REEDUCATION: CPT | Mod: GP

## 2024-10-15 NOTE — PROGRESS NOTES
PHYSICAL THERAPY EVALUATION  Type of Visit: Evaluation       Fall Risk Screen:  Fall screen completed by: PT  Have you fallen 2 or more times in the past year?: No  Have you fallen and had an injury in the past year?: No  Is patient a fall risk?: No    Subjective       Presenting condition or subjective complaint: Pt is complaining of neck and low back pain that has been going on for years.  Historically the low back was worse but lately the neck has been more irritating.  The neck is worst at night, waking up very stiff and with headache.  Low back pain is mostly on the left side and is worst after sitting for prolonged periods.  Back pain was exacerbated in August after travelling.      Date of onset: 10/15/24 (chronic, 1+ years)    Relevant medical history:     Dates & types of surgery: Appendectomy and foot surgery    Prior diagnostic imaging/testing results: MRI; X-ray; Bone scan     Prior therapy history for the same diagnosis, illness or injury: Yes PT last year    Prior Level of Function  Transfers: Independent  Ambulation: Independent  ADL: Independent  IADL:  IND    Living Environment  Social support: Alone   Type of home: Apartment/condo   Stairs to enter the home: No       Ramp: No   Stairs inside the home: No       Help at home: None  Equipment owned:       Employment: Yes Consultant, sitting in desk mostly  Hobbies/Interests: Travel, hiking    Patient goals for therapy: Sit comfortably, wake up without a headache everyday    Pain assessment: 8/10 at worst (more in the back), more often pain in the neck     Objective      Cognitive Status Examination  Orientation: Oriented to person, place and time   Level of Consciousness: Alert  Follows Commands and Answers Questions: 100% of the time  Personal Safety and Judgement: Intact  Memory: Intact    OBSERVATION: Pleasant female in NAD.  INTEGUMENTARY: Intact  POSTURE: Sitting Posture: Rounded shoulders  PALPATION: ttp of left lumbar paraspinals  SI joint  provocation tests: all neg    RANGE OF MOTION:   Cervical ROM WNL    (Degrees) Left AROM Left PROM  Right AROM Right PROM   Hip Flexion  135  135   Hip Internal Rotation  25  25   Hip External Rotation  55  55   Lumbar Side glide     Lumbar Flexion 75%   Lumbar Extension 50%* (better with repetition)   Pain: end range lumbar ROM  End feel: tissue stretch    STRENGTH:   Pain: - none + mild ++ moderate +++ severe  Strength Scale: 0-5/5 Left Right   Hip Flexion 4+ 5   Hip Abduction 4+ 4+   Hip Adduction 4+ 4+   Knee Flexion 5 5   Knee Extension 5 5     GAIT: WFL      Assessment & Plan   CLINICAL IMPRESSIONS  Medical Diagnosis: Lumbosacral spondylosis without myelopathy (M47.817) ,Chronic left-sided low back pain without sciatica (M54.50, G89.29) ,Cervical spondylosis without myelopathy (M47.812) ,Disorder of sacrum (M53.3), Myalgia, other site (M79.18), Trochanteric bursitis of left hip (M70.62)    Treatment Diagnosis: Neck and low back pain with impaired muscle performance   Impression/Assessment: Patient is a 58 year old female with left low back and neck complaints.  The following significant findings have been identified: Pain, Decreased ROM/flexibility, Decreased strength, Impaired muscle performance, Decreased activity tolerance, and Impaired posture. These impairments interfere with their ability to perform self care tasks, work tasks, and recreational activities as compared to previous level of function.     Clinical Decision Making (Complexity):  Clinical Presentation: Stable/Uncomplicated  Clinical Presentation Rationale: based on medical and personal factors listed in PT evaluation  Clinical Decision Making (Complexity): Low complexity    PLAN OF CARE  Treatment Interventions:  Interventions: Manual Therapy, Neuromuscular Re-education, Therapeutic Activity, Therapeutic Exercise    Long Term Goals     PT Goal 1  Goal Identifier: Activity tolerance  Goal Description: Pt will be able to complete work tasks  through day and wake up from sleep without pain in order to improve QOL.  Goal Progress: ongoing  Target Date: 01/12/25      Frequency of Treatment: 1x/week, taper to 2x/month  Duration of Treatment: 3 months    Recommended Referrals to Other Professionals:     Education Assessment:   Learner/Method: Patient  Education Comments: HEP, POC    Risks and benefits of evaluation/treatment have been explained.   Patient/Family/caregiver agrees with Plan of Care.     Evaluation Time:     PT Eval, Low Complexity Minutes (90392): 15       Signing Clinician: Olegario Byrd PT

## 2024-10-29 ENCOUNTER — THERAPY VISIT (OUTPATIENT)
Dept: PHYSICAL THERAPY | Facility: CLINIC | Age: 59
End: 2024-10-29
Payer: COMMERCIAL

## 2024-10-29 DIAGNOSIS — G89.29 CHRONIC LEFT-SIDED LOW BACK PAIN WITHOUT SCIATICA: Primary | ICD-10-CM

## 2024-10-29 DIAGNOSIS — M47.817 LUMBOSACRAL SPONDYLOSIS WITHOUT MYELOPATHY: ICD-10-CM

## 2024-10-29 DIAGNOSIS — M54.50 CHRONIC LEFT-SIDED LOW BACK PAIN WITHOUT SCIATICA: Primary | ICD-10-CM

## 2024-10-29 DIAGNOSIS — M53.3 DISORDER OF SACRUM: ICD-10-CM

## 2024-10-29 DIAGNOSIS — M47.812 CERVICAL SPONDYLOSIS WITHOUT MYELOPATHY: ICD-10-CM

## 2024-10-29 PROCEDURE — 97112 NEUROMUSCULAR REEDUCATION: CPT | Mod: GP

## 2024-10-29 PROCEDURE — 97140 MANUAL THERAPY 1/> REGIONS: CPT | Mod: GP

## 2024-10-29 PROCEDURE — 97110 THERAPEUTIC EXERCISES: CPT | Mod: GP

## 2024-12-03 ENCOUNTER — THERAPY VISIT (OUTPATIENT)
Dept: PHYSICAL THERAPY | Facility: CLINIC | Age: 59
End: 2024-12-03
Payer: COMMERCIAL

## 2024-12-03 DIAGNOSIS — M47.817 LUMBOSACRAL SPONDYLOSIS WITHOUT MYELOPATHY: ICD-10-CM

## 2024-12-03 DIAGNOSIS — G89.29 CHRONIC LEFT-SIDED LOW BACK PAIN WITHOUT SCIATICA: Primary | ICD-10-CM

## 2024-12-03 DIAGNOSIS — M53.3 DISORDER OF SACRUM: ICD-10-CM

## 2024-12-03 DIAGNOSIS — M54.50 CHRONIC LEFT-SIDED LOW BACK PAIN WITHOUT SCIATICA: Primary | ICD-10-CM

## 2024-12-03 DIAGNOSIS — M47.812 CERVICAL SPONDYLOSIS WITHOUT MYELOPATHY: ICD-10-CM

## 2024-12-03 PROCEDURE — 97112 NEUROMUSCULAR REEDUCATION: CPT | Mod: GP

## 2024-12-03 PROCEDURE — 97110 THERAPEUTIC EXERCISES: CPT | Mod: GP

## 2024-12-03 PROCEDURE — 97140 MANUAL THERAPY 1/> REGIONS: CPT | Mod: GP

## 2024-12-03 NOTE — PROGRESS NOTES
12/03/24 0500   Appointment Info   Signing clinician's name / credentials Olegario Byrd DPT   Total/Authorized Visits E&T   Visits Used 3 - [employer] Open Access   Medical Diagnosis Lumbosacral spondylosis without myelopathy (M47.817) ,Chronic left-sided low back pain without sciatica (M54.50, G89.29) ,Cervical spondylosis without myelopathy (M47.812) ,Disorder of sacrum (M53.3), Myalgia, other site (M79.18), Trochanteric bursitis of left hip (M70.62)   PT Tx Diagnosis Neck and low back pain with impaired muscle performance   Progress Note/Certification   Onset of illness/injury or Date of Surgery 10/15/24  (chronic, 1+ years)   Therapy Frequency 1x/week, taper to 2x/month   Predicted Duration 3 months   PT Goal 1   Goal Identifier Activity tolerance   Goal Description Pt will be able to complete work tasks through day and wake up from sleep without pain in order to improve QOL.   Goal Progress ongoing   Target Date 01/12/25   Subjective Report   Subjective Report Pt reports that her neck is feeling more manageable.  She has been sleeping with an airline pillow.  She has noticed her piriformis feels tight, but stretching helps.   Objective Measures   Objective Measures Objective Measure 1   Objective Measure 1   Objective Measure Bike   Details x4 min, level 3, 1.02 miles, 18 cals   Treatment Interventions (PT)   Interventions Therapeutic Procedure/Exercise;Neuromuscular Re-education;Manual Therapy   Therapeutic Procedure/Exercise   Therapeutic Procedures: strength, endurance, ROM, flexibility minutes (11477) 15   PTRx Ther Proc 1 Levator Scapulae Stretch   PTRx Ther Proc 1 - Details rev   PTRx Ther Proc 2 Upper Trapezius Stretch   PTRx Ther Proc 2 - Details rev   PTRx Ther Proc 3 Scalene Stretch   PTRx Ther Proc 3 - Details rev   PTRx Ther Proc 4 Shoulder Theraband Low Row/Pulldown   PTRx Ther Proc 4 - Details rev   PTRx Ther Proc 5 Shoulder Horizontal Abduction/Diagonals With Theraband   PTRx Ther Proc 5 - Details  rev   PTRx Ther Proc 6 Supine Lumbar Hip Roll   PTRx Ther Proc 6 - Details 1x20   PTRx Ther Proc 7 Seated Piriformis Stretch   PTRx Ther Proc 7 - Details 2x30 sec   PTRx Ther Proc 8 Gluteal Myofascial Self Release   PTRx Ther Proc 8 - Details 1x2 min foam roller lumbar spine and piriformis 1x2 min pilates ball for piriformis supine and against wall   Skilled Intervention education   Patient Response/Progress tolerated well   PTRx Ther Proc 9 Extended Travis Pose   PTRx Ther Proc 9 - Details 2x30 sec   Neuromuscular Re-education   Neuromuscular re-ed of mvmt, balance, coord, kinesthetic sense, posture, proprioception minutes (12076) 15   PTRx Neuro Re-ed 1 Cervical Retraction   PTRx Neuro Re-ed 1 - Details rev   PTRx Neuro Re-ed 2 Scapular Retraction/Depression   PTRx Neuro Re-ed 2 - Details rev   PTRx Neuro Re-ed 3 Shoulder Theraband Rows   PTRx Neuro Re-ed 3 - Details rev   PTRx Neuro Re-ed 4 Nerve Mobility Sciatic Position 5   PTRx Neuro Re-ed 4 - Details 1x8 B   PTRx Neuro Re-ed 5 Marching Bridge   PTRx Neuro Re-ed 5 - Details 1x10 cues for glute and core stability   PTRx Neuro Re-ed 6 Dead Bug   PTRx Neuro Re-ed 6 - Details 1x20 cues for dynamic core stability   Skilled Intervention education   Patient Response/Progress tolerated well   PTRx Neuro Re-ed 7 birddog   PTRx Neuro Re-ed 7 - Details 1x20 cues for dynamic core stability   PTRx Neuro Re-ed 8 Pallof Press   PTRx Neuro Re-ed 8 - Details 1x10 B black band cues for oblique and core stability   Manual Therapy   Manual Therapy: Mobilization, MFR, MLD, friction massage minutes (33904) 10   Manual Therapy 1 Prone STM piriformis, lumbar paraspinals   Manual Therapy 1 - Details Grade II PAs lumbar spine   Patient Response/Progress tolerated well   Education   Learner/Method Patient   Education Comments HEP, POC   Plan   Home program PTRX   Total Session Time   Timed Code Treatment Minutes 40   Total Treatment Time (sum of timed and untimed services) 40        DISCHARGE  Reason for Discharge: Patient chooses to discontinue therapy.    Equipment Issued: none    Discharge Plan: Patient to continue home program.    Referring Provider:  Andrews Pierce

## 2025-01-15 ENCOUNTER — VIRTUAL VISIT (OUTPATIENT)
Dept: ONCOLOGY | Facility: CLINIC | Age: 60
End: 2025-01-15
Attending: GENETIC COUNSELOR, MS
Payer: COMMERCIAL

## 2025-01-15 DIAGNOSIS — Z80.41 FAMILY HISTORY OF MALIGNANT NEOPLASM OF OVARY: ICD-10-CM

## 2025-01-15 DIAGNOSIS — Z80.3 FAMILY HISTORY OF MALIGNANT NEOPLASM OF BREAST: ICD-10-CM

## 2025-01-15 PROCEDURE — 96041 GENETIC COUNSELING SVC EA 30: CPT | Mod: GT,95 | Performed by: GENETIC COUNSELOR, MS

## 2025-01-15 NOTE — NURSING NOTE
Current patient location: [address]  [address]  [facility] [address]      Is the patient currently in the state of [place]? YES    Visit mode:VIDEO    If the visit is dropped, the patient can be reconnected by: VIDEO VISIT: Send to e-mail at: [email or web address]    Will anyone else be joining the visit? NO  (If patient encounters technical issues they should call [phone]    How would you like to obtain your AVS? MyChart    Are changes needed to the allergy or medication list? N/A    Reason for visit: Consult      Essence WIGGINS
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
## 2025-01-16 NOTE — PATIENT INSTRUCTIONS
Assessing Cancer Risk  Cancer is a common diagnosis which impacts many families.  Individuals may develop cancer due to environmental factors (such as exposures and lifestyle), aging, genetic predisposition, or a combination of these factors.      Only about 5-10% of cancers are thought to be due to an inherited cancer susceptibility gene.    These families often have:  Several people with the same or related types of cancer  Cancers diagnosed at a young age (before age 50)  Individuals with more than one primary cancer  Multiple generations of the family affected with cancer    Comprehensive Breast and Gynecologic Cancer Panel  We each inherit two copies of every gene in our bodies: one from our mother, and one from our father. Each gene has a specific job to do.  When a gene has a mistake or  mutation  in it, it does not work like it should.     Some people may be candidates for genetic testing of more than one gene.  For these families, genetic testing using a cancer panel may be offered. These panels will test different genes at once known to increase the risk for breast, ovarian, uterine, and/or other cancers.    This handout will review common hereditary breast and gynecologic cancer syndromes. The genes that will be discussed in this handout are: MIN, BRCA1, BRCA2, BRIP1, CDH1, CHEK2, MLH1, MSH2, MSH6, PMS2, EPCAM, PTEN, PALB2, RAD51C, RAD51D, and TP53.    The purpose of this handout is to serve as a brief summary of the breast and gynecologic cancer risk genes that have published clinical management guidelines for individuals who are found to carry a mutation. Inheriting a mutation does not mean a person will develop cancer, but it does significantly increase their risk above the general population risk.     ______________________________________________________________________________    Hereditary Breast and Ovarian Cancer Syndrome (BRCA1 and BRCA2)  A single mutation in one of the copies of BRCA1 or  BRCA2 increases the risk for breast and ovarian cancer, among others.  The risk for pancreatic cancer and melanoma may also be slightly increased in some families.  The chart below shows the chance that someone with a BRCA mutation would develop cancer in his or her lifetime1,2,3,4.       Lifetime Cancer Risks    General Population BRCA1  BRCA2   Breast  12% >60% >60%   Ovarian  1-2% 39-58% 13-29%   Prostate 12% 7-26% 19-61%   Male Breast 0.1% 0.2-1.2% 1.8-7.1%   Pancreas 1-2% Up to 5% 5-10%     A person s ethnic background is also important to consider, as individuals of Ashkenazi [place] ancestry have a higher chance of having a BRCA gene mutation.  There are three BRCA mutations that occur more frequently in this population.      Palomino Syndrome (MLH1, MSH2, MSH6, PMS2, and EPCAM)  Currently five genes are known to cause Palomino Syndrome: MLH1, MSH2, MSH6, PMS2, and EPCAM.  A single mutation in one of the Palomino Syndrome genes increases the risk for colon, endometrial, ovarian, and stomach cancers.  Other cancers that occur less commonly in Palomino Syndrome include urinary tract, skin, and brain cancers.  The chart below shows the chance that a person with Palomino syndrome would develop cancer in his or her lifetime5.      Lifetime Cancer Risks    General Population Palomino Syndrome   Colon 5% 10-61%   Endometrial 3% 13-57%   Ovarian 1-2% 1-38%   Stomach <1% 1-9%   *Cancer risk varies depending on Palomino syndrome gene found      Cowden Syndrome (PTEN)  Cowden syndrome is a hereditary condition that increases the risk for breast, thyroid, endometrial, colon, and kidney cancer.  Cowden syndrome is caused by a mutation in the PTEN gene.  A single mutation in one of the copies of PTEN causes Cowden syndrome and increases cancer risk.  The chart below shows the chance that someone with a PTEN mutation would develop cancer in their lifetime6,7.  Other benign features seen in some individuals with Cowden syndrome include benign  skin lesions (facial papules, keratoses, lipomas), learning disability, autism, thyroid nodules, colon polyps, and larger head size.     Lifetime Cancer Risks    General Population Cowden   Breast 12% 40-60%*   Thyroid 1% Up to 38%   Renal 1-2% Up to 35%   Endometrial 3% Up to 28%   Colon 5% Up to 9%   Melanoma 2-3% Up to 6%   *Emerging data suggests the risk for breast cancer could be greater than 60%               Li-Fraumeni Syndrome (TP53)  Li-Fraumeni Syndrome (LFS) is a cancer predisposition syndrome caused by a mutation in the TP53 gene. A single mutation in one of the copies of TP53 increases the risk for multiple cancers. Individuals with LFS are at an increased risk for developing cancer at a young age. The lifetime risk for development of a LFS-associated cancer is 50% by age 30 and 90% by age 60.   Core Cancers: Sarcomas, Breast, Brain, Lung, Leukemias/Lymphomas, Adrenocortical carcinomas  Other Cancers: Gastrointestinal, Thyroid, Skin, Genitourinary       Hereditary Diffuse Gastric Cancer (CDH1)  Currently, one gene is known to cause hereditary diffuse gastric cancer (HDGC): CDH1.  Individuals with HDGC are at increased risk for diffuse gastric cancer and lobular breast cancer. Of people diagnosed with HDGC, 30-50% have a mutation in the CDH1 gene.  This suggests there are likely other genes that may cause HDGC that have not been identified yet.      Lifetime Cancer Risks    General Population HDGC   Diffuse Gastric  <1% ~80%   Breast 12% 41-60%       Additional Genes    MIN  MIN is a moderate-risk breast cancer gene. Women who have a mutation in MIN can have between a 2-4 fold increased risk for breast cancer compared to the general population8. MIN mutations have also been associated with increased risk for pancreatic cancer between 5-10%9. Individuals who inherit two MIN mutations have a condition called ataxia-telangiectasia (AT).  This rare autosomal recessive condition affects the nervous system  and immune system, and is associated with progressive cerebellar ataxia beginning in childhood. Individuals with ataxia-telangiectasia often have a weakened immune system and have an increased risk for childhood cancers.    PALB2  Mutations in PALB2 have been shown to increase the risk of breast cancer up to 41-60% in some families; where individuals fall within this risk range is dependent upon family ioetexs42. PALB2 mutations have also been associated with increased risk for pancreatic cancer between 5-10%.  Individuals who inherit two PALB2 mutations--one from their mother and one from their father--have a condition called Fanconi Anemia.  This rare autosomal recessive condition is associated with short stature, developmental delay, bone marrow failure, and increased risk for childhood cancers.    CHEK2   CHEK2 is a moderate-risk breast cancer gene.  Women who have a mutation in CHEK2 have around a 2-4 fold increased risk for breast cancer compared to the general population, and this risk may be higher depending upon family history.11,12,13 The risk of colon cancer may be twice as high as the general population risk of colon cancer of 5%. Mutations in CHEK2 have also been shown to increase the risk of other cancers, including prostate, however these cancer risks are currently not well understood.    BRIP1, RAD51C and RAD51D  Mutations in RAD51C and RAD51D have been shown to increase the risk of ovarian cancer and breast cancer 14,. Mutations in BRIP1 have been shown to increase the risk of ovarian cancer and possibly female breast cancer 15 .       Lifetime Cancer Risk    General Population        BRIP1   RAD51C  RAD51D   Breast 12% Not well defined 20-40% 20-40%   Ovarian 1-2% 5-15% 10-15% 10-20%     ______________________________________________________________  Inheritance  All of the cancer syndromes reviewed above are inherited in an autosomal dominant pattern.  This means that if a parent has a mutation,  each of their children will have a 50% chance of inheriting that same mutation. Therefore, each child --male or female-- would have a 50% chance of being at increased risk for developing cancer.    Image obtained from Genetics Home Reference, 2013     Mutations in some genes can occur de onesimo, which means that a person s mutation occurred for the first time in them and was not inherited from a parent.  Now that they have the mutation, however, it can be passed on to future generations.    Genetic Testing  Genetic testing involves a blood test and will look for any harmful mutations that are associated with increased cancer risk.  If possible, it is recommended that the person(s) who has had cancer be tested before other family members.  That person will give us the most useful information about whether or not a specific gene is associated with the cancer in the family.    Results  There are three possible results of genetic testing:  Positive--a harmful mutation was identified in one or more of the genes  Negative--no mutations were identified in any of the genes tested  Variant of unknown significance--a variation in one of the genes was identified, but it is unclear how this impacts cancer risk in the family    Advantages and Disadvantages   There are advantages and disadvantages to genetic testing.    Advantages  May clarify your cancer risk  Can help you make medical decisions  May explain the cancers in your family  May give useful information to your family members (if you share your results)    Disadvantages  Possible negative emotional impact of learning about inherited cancer risk  Uncertainty in interpreting a negative test result in some situations  Possible genetic discrimination concerns (see below)    Genetic Information Nondiscrimination Act (SANTINO)  The Genetic Information Nondiscrimination Act of 2008 (SANTINO) is a federal law that protects individuals from health insurance or employment discrimination  based on a genetic test result alone (with some exceptions, including employers with fewer than 15 employees, and ).  Although rare, SANTINO  does not cover discrimination protections in terms of life insurance, long term care, or disability insurances.  Visit the National Human Implanet Research Saxon website to learn more.    Reducing Cancer Risk  All of the genes described in this handout have nationally recognized cancer screening guidelines that would be recommended for individuals who test positive.  In addition to increased cancer screening, surgeries may be offered or recommended to reduce cancer risk.  Recommendations are based upon an individual s genetic test result as well as their personal and family history of cancer.    Questions to Think About Regarding Genetic Testing:  What effect will the test result have on me and my relationship with my family members if I have an inherited gene mutation?  If I don t have a gene mutation?  Should I share my test results, and how will my family react to this news, which may also affect them?  Are my children ready to learn new information that may one day affect their own health?    Hereditary Cancer Resources    FORCE: Facing Our Risk of Cancer Empowered facingourrisk.org   Bright Pink bebrightpink.org   Li-Fraumeni Syndrome Association lfsassociation.org   PTEN World PTENworld.com   No stomach for cancer, Inc. nostomachforcancer.org   Stomach cancer relief network Scrnet.org   Collaborative Group of the Americas on Inherited Colorectal Cancer (CGA) cgaicc.com    Cancer Care cancercare.org   American Cancer Society (ACS) cancer.org   National Cancer Saxon (NCI) cancer.gov     Please call us if you have any questions or concerns.   Cancer Risk Management Program [phone]-CANCER ([phone])  Tacos Flanagan, MS [facility] [place]  [phone]  Genet Moreno, MS, [facility] [place] [phone]  Starr Bowden, MS, [facility] [place]  [phone]  Shelly Albright, MS, [facility] [place]  [phone]  Brooke Pinto,  MS, [facility] [place]  [phone]  Caren Gonzalez, MS, [facility] [place] [phone]  Mary Miller, MS, [facility] [place] [phone]    References  Wendy Phelps PDP, Aazlia S, Rupinder HODGE, Saúl JE, Jason JL, Alexandra N, Jai H, Myranda O, Padmini A, Pasini B, Radidmitry P, Manpaul S, Hang DM, Morales N, Taras E, Rupa H, Lebron E, Memo J, Gronhelen J, Everett B, Tulinius H, Thorlacius S, Eerola H, Nevanlinna H, Edwina K, Keke OP. Average risks of breast and ovarian cancer associated with BRCA1 or BRCA2 mutations detected in case series unselected for family history: a combined analysis of 222 studies. Am J Hum Raiza. 2003;72:1117-30.  Genia N, Love M, Jenna G.  BRCA1 and BRCA2 Hereditary Breast and Ovarian Cancer. Gene Reviews online. 2013.  Vincent YC, Ted S, Sheila G, Mccall S. Breast cancer risk among male BRCA1 and BRCA2 mutation carriers. J Natl Cancer Inst. 2007;99:1811-4.  Andrew BECERRIL, Dustin I, Ruben J, Mannie E, Tani ER, Alvino F. Risk of breast cancer in male BRCA2 carriers. J Med Raiza. 2010;47:710-1.  National Comprehensive Cancer Network. Clinical practice guidelines in oncology, colorectal cancer screening. Available online (registration required). 2015.  Vish MH, Francisco J, Steve J, Delilah VILLALOBOS, Milton MS, Eng C. Lifetime cancer risks in individuals with germline PTEN mutations. Clin Cancer Res. 2012;18:400-7.  Rosana R. Cowden Syndrome: A Critical Review of the Clinical Literature. J Raiza . 2009:18:13-27.  Hannah BROWN, Usama FELIX, Thanh S, Alexandrea P, Karolina T, Christiano M, Mark B, Lilli H, Parul R, Nicolás K, Maria Elena L, Andrew BECERRIL, Hang FELIX, Kashmir DF, Charmaine MR, The Breast Cancer Susceptibility Collaboration (UK) & aMranda ONEILL. MIN mutations that cause ataxia-telangiectasia are breast cancer susceptibility alleles. Nature Genetics. 2006;38:873-875  Tato N , Tiana Y, Cait J, Johnson L, Dominique MONROY , Gemini ML, Justin S, Vj AG, Jennifer S, Richard ML, Scottie J , Christopher R, Nevaeh DAVIDSON, Nathaly  JR, Rosemarie VE, Lexx M, Vomarilynstein B, Ashvin N, Haresh RH, Sanjana KW, and Cecelia AP. MIN mutations in patients with hereditary pancreatic cancer. Cancer Discover. 2012;2:41-46  Geo SOLIMAN., et al. Breast-Cancer Risk in Families with Mutations in PALB2. NEJM. 2014; 371(6):497-506.  CHEK2 Breast Cancer Case-Control Consortium. CHEK2*1100delC and susceptibility to breast cancer: A collaborative analysis involving 10,860 breast cancer cases and 9,065 controls from 10 studies. Am J Hum Raiza, 74 (2004), pp. 5477-7408  Joe T, Stephanie S, Alexander K, et al. Spectrum of Mutations in BRCA1, BRCA2, CHEK2, and TP53 in Families at High Risk of Breast Cancer. TERESO. 2006;295(12):7301-7329.   Ysey C, Eulalia D, Antonette BROWN, et al. Risk of breast cancer in women with a CHEK2 mutation with and without a family history of breast cancer. J Clin Oncol. 2011;29:1350-9200.  Song H, Tayos E, Ramus SJ, et al. Contribution of germline mutations in the RAD51B, RAD51C, and RAD51D genes to ovarian cancer in the population. J Clin Oncol. 2015;33(26):8342-9103. Doi:10.1200/JCO.2015.61.2408.  Xiomara T, Fabiano DF, Frida P, et al. Mutations in BRIP1 confer high risk of ovarian cancer. Francisca Raiza. 2011;43(11):4654-2331. doi:10.1038/ng.955.

## 2025-01-17 ENCOUNTER — LAB (OUTPATIENT)
Dept: LAB | Facility: CLINIC | Age: 60
End: 2025-01-17
Attending: GENETIC COUNSELOR, MS
Payer: COMMERCIAL

## 2025-01-17 DIAGNOSIS — Z80.3 FAMILY HISTORY OF MALIGNANT NEOPLASM OF BREAST: ICD-10-CM

## 2025-01-17 DIAGNOSIS — Z80.41 FAMILY HISTORY OF MALIGNANT NEOPLASM OF OVARY: ICD-10-CM

## 2025-01-17 LAB
Lab: NORMAL
PERFORMING LABORATORY: NORMAL
SPECIMEN STATUS: NORMAL
TEST NAME: NORMAL

## 2025-01-17 PROCEDURE — 36415 COLL VENOUS BLD VENIPUNCTURE: CPT | Performed by: PATHOLOGY

## 2025-01-17 PROCEDURE — 99000 SPECIMEN HANDLING OFFICE-LAB: CPT | Performed by: PATHOLOGY

## 2025-01-30 DIAGNOSIS — Z80.3 FAMILY HISTORY OF MALIGNANT NEOPLASM OF BREAST: ICD-10-CM

## 2025-01-30 DIAGNOSIS — Z80.41 FAMILY HISTORY OF MALIGNANT NEOPLASM OF OVARY: Primary | ICD-10-CM

## 2025-01-30 LAB
SCANNED LAB RESULT: NORMAL
TEST NAME: NORMAL

## 2025-02-05 ENCOUNTER — VIRTUAL VISIT (OUTPATIENT)
Dept: ONCOLOGY | Facility: CLINIC | Age: 60
End: 2025-02-05
Attending: GENETIC COUNSELOR, MS
Payer: COMMERCIAL

## 2025-02-05 DIAGNOSIS — Z80.3 FAMILY HISTORY OF MALIGNANT NEOPLASM OF BREAST: ICD-10-CM

## 2025-02-05 DIAGNOSIS — Z80.41 FAMILY HISTORY OF MALIGNANT NEOPLASM OF OVARY: ICD-10-CM

## 2025-02-05 PROCEDURE — 96041 GENETIC COUNSELING SVC EA 30: CPT | Mod: GT,95 | Performed by: GENETIC COUNSELOR, MS

## 2025-02-05 NOTE — PROGRESS NOTES
"2/5/2025    Virtual Visit Details  Type of service:  Video Visit   Originating Location (pt. Location): Home  Distant Location (provider location):  Off-site  Platform used for Video Visit: Nate    Referring Provider: self-referred    Presenting Information:  I spoke to Shi by video today to discuss her genetic testing results. We last met on 1/15/2025 and her blood was drawn on 1/17/2025. The [employer] Common Hereditary Cancers Panel was ordered from [employer]. This testing was done because of Shi's biological family history of cancer.    Genetic Testing Result: NEGATIVE  Shi is negative for mutations in the APC, MIN, AXIN2, BAP1, BARD1, BMPR1A, BRCA1, BRCA2, BRIP1, CDH1, CDK4, CDKN2A, CHEK2, CTNNA1, DICER1, EPCAM, FH, GREM1, HOXB13, KIT, MBD4, MEN1, MLH1, MSH2, MSH3, MSH6, MUTYH, NF1, NTHL1, PALB2, PDGFRA, PMS2, POLD1, POLE, PTEN, RAD51C, RAD51D, SDHA, SDHB, SDHC, SDHD, SMAD4, SMARCA4, STK11, TP53, TSC1, TSC2, and VHL genes.      No mutations were found in any of the 43 genes analyzed. This test involved sequencing and deletion/duplication analysis of all genes with the exceptions of EPCAM and GREM1 (deletions/duplications only) and SDHA (sequencing only).      A copy of the test report can be found in the Laboratory tab, dated 1/17/2025, and named \"LABORATORY MISCELLANEOUS RESULT.\" The report is scanned in as a linked document.    Interpretation:  We discussed several different interpretations of this negative test result.    One explanation may be that there is a different gene or combination of genes and environment that are associated with the cancers in her biological family that are not identifiable using this genetic test. As such, Shi is encouraged to contact me regularly to review any new genetic testing options that may be appropriate for her.  Another explanation may be that her biological mother did have a mutation in one of these 43 genes and Shi did not inherit it.  There is also a small " possibility that there is a mutation in one of these genes, and the testing laboratory could not find it with their current testing methods.       Cancer Screening:  Based on this negative test result, it is important for Shi and her relatives to refer back to the family history for appropriate cancer screening.    Due to Shi's biological family history of ovarian cancer, Shi remains at slightly increased risk for ovarian cancer. We discussed available ovarian cancer screening (pelvic exams, CA-125 blood tests, and transvaginal ultrasounds) as well as the significant limitations of this screening. As such, this screening is not typically recommended. That being said, women in this family should discuss this screening and the signs and symptoms of ovarian cancer with their primary OB/GYN provider, as they may have individualized recommendations.  Other population cancer screening options, such as those recommended by the American Cancer Society and the National Comprehensive Cancer Network (NCCN), are also appropriate for Shi and her family. These screening recommendations may change if there are changes to Shi's personal and/or family history of cancer. Final screening recommendations should be made in consultation with each individual's primary care provider.      Inheritance:  We reviewed the autosomal dominant inheritance of mutations in these 43 genes. We discussed that Shi did not pass on an identifiable mutation in these genes to her children based on this test result. Mutations in these genes do not skip generations.      Additional Testing Considerations:  Although Shi's genetic testing result was negative, other relatives may still carry a gene mutation associated with hereditary cancer. Genetic counseling is recommended for Shi's biological maternal half brothers to discuss their genetic testing options. If any of these relatives do pursue genetic testing, Shi is encouraged to contact me so that  we may review the impact of their test results on her.    Summary:  We do not have an explanation for Shi's biological family history of cancer. While no genetic changes were identified, Shi may still be at risk for certain cancers due to family history, environmental factors, or other genetic causes not identified by this test. Because of that, it is important that she continue with cancer screening based on her personal and family history as discussed above.    Genetic testing is rapidly advancing, and new cancer susceptibility genes will most likely be identified in the future. Therefore, I encouraged Shi to contact me regularly or if there are changes in her personal or family history. This may change how we assess her cancer risk, screening, and the testing we would offer.    Plan:  1. Shi was provided a copy of her test results via [employer].  2. She plans to follow-up with her medical providers as discussed above.  3. She should contact me periodically, or if her personal or family history of cancer changes.    I spent 19 minutes on the date of the encounter doing chart review, history and exam, documentation and further activities as noted above.    Caren Gonzalez MS, [facility] [place]  Licensed, Certified Genetic Counselor  Office: [phone]  [email or web address]

## 2025-02-05 NOTE — LETTER
"2/5/2025      Shi Baker  [address]  [address]  [facility] [address]      Dear Colleague,    Thank you for referring your patient, Shi Baker, to the [facility]. Please see a copy of my visit note below.    2/5/2025    Virtual Visit Details  Type of service:  Video Visit   Originating Location (pt. Location): Home  Distant Location (provider location):  Off-site  Platform used for Video Visit: [facility]    Referring Provider: self-referred    Presenting Information:  I spoke to Shi by video today to discuss her genetic testing results. We last met on 1/15/2025 and her blood was drawn on 1/17/2025. The [employer] Common Hereditary Cancers Panel was ordered from [employer]. This testing was done because of Shi's biological family history of cancer.    Genetic Testing Result: NEGATIVE  Shi is negative for mutations in the APC, MIN, AXIN2, BAP1, BARD1, BMPR1A, BRCA1, BRCA2, BRIP1, CDH1, CDK4, CDKN2A, CHEK2, CTNNA1, DICER1, EPCAM, FH, GREM1, HOXB13, KIT, MBD4, MEN1, MLH1, MSH2, MSH3, MSH6, MUTYH, NF1, NTHL1, PALB2, PDGFRA, PMS2, POLD1, POLE, PTEN, RAD51C, RAD51D, SDHA, SDHB, SDHC, SDHD, SMAD4, SMARCA4, STK11, TP53, TSC1, TSC2, and VHL genes.      No mutations were found in any of the 43 genes analyzed. This test involved sequencing and deletion/duplication analysis of all genes with the exceptions of EPCAM and GREM1 (deletions/duplications only) and SDHA (sequencing only).      A copy of the test report can be found in the Laboratory tab, dated 1/17/2025, and named \"LABORATORY MISCELLANEOUS RESULT.\" The report is scanned in as a linked document.    Interpretation:  We discussed several different interpretations of this negative test result.    One explanation may be that there is a different gene or combination of genes and environment that are associated with the cancers in her biological family that are not identifiable using this genetic test. As such, Shi is encouraged to contact " me regularly to review any new genetic testing options that may be appropriate for her.  Another explanation may be that her biological mother did have a mutation in one of these 43 genes and Shi did not inherit it.  There is also a small possibility that there is a mutation in one of these genes, and the testing laboratory could not find it with their current testing methods.       Cancer Screening:  Based on this negative test result, it is important for Shi and her relatives to refer back to the family history for appropriate cancer screening.    Due to Shi's biological family history of ovarian cancer, Shi remains at slightly increased risk for ovarian cancer. We discussed available ovarian cancer screening (pelvic exams, CA-125 blood tests, and transvaginal ultrasounds) as well as the significant limitations of this screening. As such, this screening is not typically recommended. That being said, women in this family should discuss this screening and the signs and symptoms of ovarian cancer with their primary OB/GYN provider, as they may have individualized recommendations.  Other population cancer screening options, such as those recommended by the American Cancer Society and the National Comprehensive Cancer Network (NCCN), are also appropriate for Shi and her family. These screening recommendations may change if there are changes to Shi's personal and/or family history of cancer. Final screening recommendations should be made in consultation with each individual's primary care provider.      Inheritance:  We reviewed the autosomal dominant inheritance of mutations in these 43 genes. We discussed that Shi did not pass on an identifiable mutation in these genes to her children based on this test result. Mutations in these genes do not skip generations.      Additional Testing Considerations:  Although Shi's genetic testing result was negative, other relatives may still carry a gene mutation associated  with hereditary cancer. Genetic counseling is recommended for Shi's biological maternal half brothers to discuss their genetic testing options. If any of these relatives do pursue genetic testing, Shi is encouraged to contact me so that we may review the impact of their test results on her.    Summary:  We do not have an explanation for Shi's biological family history of cancer. While no genetic changes were identified, Shi may still be at risk for certain cancers due to family history, environmental factors, or other genetic causes not identified by this test. Because of that, it is important that she continue with cancer screening based on her personal and family history as discussed above.    Genetic testing is rapidly advancing, and new cancer susceptibility genes will most likely be identified in the future. Therefore, I encouraged Shi to contact me regularly or if there are changes in her personal or family history. This may change how we assess her cancer risk, screening, and the testing we would offer.    Plan:  1. Shi was provided a copy of her test results via [employer].  2. She plans to follow-up with her medical providers as discussed above.  3. She should contact me periodically, or if her personal or family history of cancer changes.    I spent 19 minutes on the date of the encounter doing chart review, history and exam, documentation and further activities as noted above.    Caren Gonzalez MS, [facility] [place]  Licensed, Certified Genetic Counselor  Office: [phone]  [email or web address]      Again, thank you for allowing me to participate in the care of your patient.        Sincerely,        Caren Gonzalez GC    Electronically signed

## 2025-02-05 NOTE — NURSING NOTE
Is the patient currently in the state of [place]? YES    Current patient location: [address]  [address]  [facility] [address]    Visit mode:Video    If the visit is dropped, the patient can be reconnected by: VIDEO VISIT:  Send e-mail to at [email or web address]    Will anyone else be joining the visit? No  (If patient encounters technical issues they should call [phone])    Are changes needed to the allergy or medication list? N/A    Are refills needed on medications prescribed by this physician? No    Rooming Documentation: Questionnaire(s) not done per department protocol.    Reason for visit: RECHECK     FELICIANO Henry

## 2025-02-05 NOTE — PATIENT INSTRUCTIONS
Negative Genetic Test Result    Genetic Testing  Genetic testing involved a blood or saliva test which looked at the genetic information in select genes for variants associated with cancer risk.  This testing may have included analysis of a single gene due to a known variant in the family, multiple genes most associated with the cancers in a family, or an expanded panel of genes related to many types of cancers.     Results  There are several possible genetic test results, including:   Positive--a harmful mutation (also known as a  pathogenic  or  likely pathogenic  variant) was identified in a gene associated with increased cancer risk.  These risks, as well as medical management options, depend on the specific genetic variant identified.    Negative--no variants were identified in the genes analyzed   Variant of unknown significance--a variant was identified in one or more genes, though it is currently unclear how this impacts cancer risk in the family.  Genetic testing labs are working to collect evidence about these uncertain variants and may provide updates in the future.    What Does a Negative Genetic Test Result Mean?  A negative genetic test results means that no genetic changes (variants) were detected in the genes tested. While no inherited risk factors for cancer were identified, you and your family may be at risk for certain cancers due to family history, environmental factors, or other genetic causes not identified by this test.  It is also possible that your family may still be at risk of carrying a genetic risk factor which you did not inherit. Your genetic counselor can help interpret the result for you and your relatives.      It is important to note which genes were included in your test. A list of these genes can be found on your test result.    Screening Recommendations  A combination of personal and family history factors may inform cancer risk and medical management recommendations.   Population cancer screening options, such as those recommended by the American Cancer Society and the National Comprehensive Cancer Network (NCCN) are appropriate for many families at average risk for cancer.  However, earlier and/or more frequent screening may be recommended based on personal factors (lifestyle, exposures, medications, screening results), family history of cancer, and sometimes genetic factors.  These cancer risk management options should be discussed in more detail with an individual's medical providers.      Please call us if you have any questions or concerns.   Cancer Risk Management Program (Appointments: [phone])  Tacos Flanagan, MS [facility]  [phone]  Genet Moreno, MS, [facility] [phone]  Starr Bowden, MS, [facility]  [phone]  Shelly Albright, MS, [facility]  [phone]  Brooke Pinto, MS, [facility]  [phone]  Caren Gonzalez, MS, [facility] [phone]  Mary Miller, MS, [facility] [phone]

## 2025-05-14 ENCOUNTER — ANCILLARY PROCEDURE (OUTPATIENT)
Dept: MAMMOGRAPHY | Facility: CLINIC | Age: 60
End: 2025-05-14
Attending: INTERNAL MEDICINE
Payer: COMMERCIAL

## 2025-05-14 DIAGNOSIS — Z12.31 VISIT FOR SCREENING MAMMOGRAM: ICD-10-CM

## 2025-05-14 PROCEDURE — 77067 SCR MAMMO BI INCL CAD: CPT

## 2025-05-14 PROCEDURE — 77063 BREAST TOMOSYNTHESIS BI: CPT

## 2025-07-24 SDOH — HEALTH STABILITY: PHYSICAL HEALTH: ON AVERAGE, HOW MANY MINUTES DO YOU ENGAGE IN EXERCISE AT THIS LEVEL?: 60 MIN

## 2025-07-24 SDOH — HEALTH STABILITY: PHYSICAL HEALTH: ON AVERAGE, HOW MANY DAYS PER WEEK DO YOU ENGAGE IN MODERATE TO STRENUOUS EXERCISE (LIKE A BRISK WALK)?: 5 DAYS

## 2025-07-24 ASSESSMENT — SOCIAL DETERMINANTS OF HEALTH (SDOH): HOW OFTEN DO YOU GET TOGETHER WITH FRIENDS OR RELATIVES?: MORE THAN THREE TIMES A WEEK

## 2025-07-29 ENCOUNTER — LAB (OUTPATIENT)
Dept: LAB | Facility: CLINIC | Age: 60
End: 2025-07-29
Payer: COMMERCIAL

## 2025-07-29 ENCOUNTER — PRE VISIT (OUTPATIENT)
Dept: INTERNAL MEDICINE | Facility: CLINIC | Age: 60
End: 2025-07-29

## 2025-07-29 ENCOUNTER — OFFICE VISIT (OUTPATIENT)
Dept: INTERNAL MEDICINE | Facility: CLINIC | Age: 60
End: 2025-07-29
Payer: COMMERCIAL

## 2025-07-29 VITALS
OXYGEN SATURATION: 97 % | DIASTOLIC BLOOD PRESSURE: 79 MMHG | HEART RATE: 82 BPM | WEIGHT: 133 LBS | RESPIRATION RATE: 16 BRPM | TEMPERATURE: 97.9 F | HEIGHT: 67 IN | SYSTOLIC BLOOD PRESSURE: 121 MMHG | BODY MASS INDEX: 20.88 KG/M2

## 2025-07-29 DIAGNOSIS — Z00.00 VISIT FOR PREVENTIVE HEALTH EXAMINATION: ICD-10-CM

## 2025-07-29 DIAGNOSIS — Z12.4 CERVICAL CANCER SCREENING: Primary | ICD-10-CM

## 2025-07-29 LAB
ANION GAP SERPL CALCULATED.3IONS-SCNC: 10 MMOL/L (ref 7–15)
BUN SERPL-MCNC: 12.2 MG/DL (ref 8–23)
CALCIUM SERPL-MCNC: 10.1 MG/DL (ref 8.8–10.4)
CHLORIDE SERPL-SCNC: 101 MMOL/L (ref 98–107)
CHOLEST SERPL-MCNC: 214 MG/DL
CREAT SERPL-MCNC: 0.82 MG/DL (ref 0.51–0.95)
EGFRCR SERPLBLD CKD-EPI 2021: 82 ML/MIN/1.73M2
FASTING STATUS PATIENT QL REPORTED: NO
FASTING STATUS PATIENT QL REPORTED: NO
GLUCOSE SERPL-MCNC: 106 MG/DL (ref 70–99)
HCO3 SERPL-SCNC: 29 MMOL/L (ref 22–29)
HDLC SERPL-MCNC: 103 MG/DL
LDLC SERPL CALC-MCNC: 98 MG/DL
NONHDLC SERPL-MCNC: 111 MG/DL
POTASSIUM SERPL-SCNC: 3.8 MMOL/L (ref 3.4–5.3)
SODIUM SERPL-SCNC: 140 MMOL/L (ref 135–145)
TRIGL SERPL-MCNC: 65 MG/DL

## 2025-07-29 PROCEDURE — 3078F DIAST BP <80 MM HG: CPT | Performed by: INTERNAL MEDICINE

## 2025-07-29 PROCEDURE — 99396 PREV VISIT EST AGE 40-64: CPT | Performed by: INTERNAL MEDICINE

## 2025-07-29 PROCEDURE — 3048F LDL-C <100 MG/DL: CPT | Performed by: INTERNAL MEDICINE

## 2025-07-29 PROCEDURE — 99000 SPECIMEN HANDLING OFFICE-LAB: CPT | Performed by: PATHOLOGY

## 2025-07-29 PROCEDURE — 80048 BASIC METABOLIC PNL TOTAL CA: CPT | Performed by: PATHOLOGY

## 2025-07-29 PROCEDURE — 36415 COLL VENOUS BLD VENIPUNCTURE: CPT | Performed by: PATHOLOGY

## 2025-07-29 PROCEDURE — 3074F SYST BP LT 130 MM HG: CPT | Performed by: INTERNAL MEDICINE

## 2025-07-29 PROCEDURE — 80061 LIPID PANEL: CPT | Performed by: PATHOLOGY

## 2025-07-29 PROCEDURE — 87624 HPV HI-RISK TYP POOLED RSLT: CPT | Performed by: INTERNAL MEDICINE

## 2025-07-29 PROCEDURE — G0123 SCREEN CERV/VAG THIN LAYER: HCPCS | Performed by: INTERNAL MEDICINE

## 2025-07-29 NOTE — TELEPHONE ENCOUNTER
Date of upcoming preventative visit: 7/29/25    Date of last preventative visit: 7/27/25    Relevant notes from last preventative visit: Assessment & Plan  Preventative health care  Discussed preventive healthcare needs with patient.  Recommend COVID-vaccine in the fall.  Patient is up-to-date on Pap smear.  - Comprehensive metabolic panel; Future  - TSH with free T4 reflex; Future  - Lipid panel reflex to direct LDL Fasting; Future     Osteopenia of multiple sites  Reviewed recent DEXA scan with patient.  Recommend repeating the test as the recommendation was to follow-up in 3 years.  May consider additional testing and interventions depending on the bone density results.  - DX Bone Density; Future     Chronic left-sided low back pain without sciatica  Patient likely has musculoskeletal etiology of pain.  However, given persistent symptoms recommend further evaluation.  Referral to sports medicine clinic was provided.  - Orthopedic  Referral; Future     Counseling  Appropriate preventive services were addressed with this patient via screening, questionnaire, or discussion as appropriate for fall prevention, nutrition, physical activity, Tobacco-use cessation, weight loss and cognition.  Checklist reviewing preventive services available has been given to the patient.  Reviewed patient's diet, addressing concerns and/or questions.   She is at risk for psychosocial distress and has been provided with information to reduce risk.     Orders patient completed: lab orders from 7/27/25    Orders patient needs to complete: N/A    Vaccinations Due: Covid and Pneumococcal     Actions needed for upcoming preventative visit: PAP due on 11/4/25    Medications due for Refill: N/A    Room Set up needed: Gown/drape, PAP tray- ask provider first

## 2025-07-29 NOTE — PROGRESS NOTES
Preventive Care Visit  [facility]  Sandra Morales MD, Internal Medicine  Jul 29, 2025      Assessment & Plan     Visit for preventive health examination  Discussed preventive healthcare needs with patient.  Advised on cervical cancer screening as well as vaccines.  Patient is planning to get her vaccines at the local pharmacy.    - REVIEW OF HEALTH MAINTENANCE PROTOCOL ORDERS  - HPV and Gynecologic Cytology Panel - Recommended Age 30 - 65 Years  - Lipid panel reflex to direct LDL Fasting; Future  - Basic metabolic panel  (Ca, Cl, CO2, Creat, Gluc, K, Na, BUN); Future      Counseling  Appropriate preventive services were addressed with this patient via screening, questionnaire, or discussion as appropriate for fall prevention, nutrition, physical activity, Tobacco-use cessation, social engagement, weight loss and cognition.  Checklist reviewing preventive services available has been given to the patient.  Reviewed patient's diet, addressing concerns and/or questions.           Swati Osullivan is a 59 year old, presenting for the following:  Physical        7/29/2025     3:37 PM   Additional Questions   Roomed by Tania GROVER   Accompanied by N/A          HPI       Patient is here for routine preventive visit.  She reports that overall she has been doing well.  She denies any new health issues.    Advance Care Planning    Document on file is a Health Care Directive or POLST.        7/24/2025   General Health   How would you rate your overall physical health? Good   Feel stress (tense, anxious, or unable to sleep) Only a little   (!) STRESS CONCERN      7/24/2025   Nutrition   Three or more servings of calcium each day? Yes   Diet: Vegetarian/vegan   How many servings of fruit and vegetables per day? (!) 2-3   How many sweetened beverages each day? 0-1         7/24/2025   Exercise   Days per week of moderate/strenous exercise 5 days   Average minutes spent exercising  at this level 60 min         7/24/2025   Social Factors   Frequency of gathering with friends or relatives More than three times a week   Worry food won't last until get money to buy more No   Food not last or not have enough money for food? No   Do you have housing? (Housing is defined as stable permanent housing and does not include staying outside in a car, in a tent, in an abandoned building, in an overnight shelter, or couch-surfing.) Yes   Are you worried about losing your housing? No   Lack of transportation? No   Unable to get utilities (heat,electricity)? No         7/24/2025   Fall Risk   Fallen 2 or more times in the past year? No   Trouble with walking or balance? No          7/24/2025   Dental   Dentist two times every year? Yes         Today's PHQ-2 Score:       7/28/2025     4:56 PM   PHQ-2 ( 1999 [employer])   Q1: Little interest or pleasure in doing things 0   Q2: Feeling down, depressed or hopeless 0   PHQ-2 Score 0    Q1: Little interest or pleasure in doing things Not at all   Q2: Feeling down, depressed or hopeless Not at all   PHQ-2 Score 0       Patient-reported           7/24/2025   Substance Use   Alcohol more than 3/day or more than 7/wk No   Do you use any other substances recreationally? No     Social History     Tobacco Use    Smoking status: Never    Smokeless tobacco: Never   Substance Use Topics    Alcohol use: Yes     Alcohol/week: 1.0 standard drink of alcohol     Types: 1 Standard drinks or equivalent per week     Comment: Occasional    Drug use: No           5/14/2025   LAST FHS-7 RESULTS   1st degree relative breast or ovarian cancer Yes   Any relative bilateral breast cancer Unknown   Any male have breast cancer Unknown   Any ONE woman have BOTH breast AND ovarian cancer Unknown   Any woman with breast cancer before 50yrs Unknown   2 or more relatives with breast AND/OR ovarian cancer Unknown   2 or more relatives with breast AND/OR bowel cancer Unknown        Mammogram Screening -  "Mammogram every 1-2 years updated in Health Maintenance based on mutual decision making        7/24/2025   STI Screening   New sexual partner(s) since last STI/HIV test? (!) YES      History of abnormal Pap smear: No - age 30-64 HPV with reflex Pap every 5 years recommended        Latest Ref Rng & Units 11/4/2020     4:41 PM 11/4/2020     4:30 PM   PAP / HPV   PAP (Historical)  NIL     HPV 16 DNA NEG^Negative  Negative    HPV 18 DNA NEG^Negative  Negative    Other HR HPV NEG^Negative  Negative      ASCVD Risk   The ASCVD Risk score (Fly WHITMAN, et al., 2019) failed to calculate for the following reasons:    The valid HDL cholesterol range is 20 to 100 mg/dL           Reviewed and updated as needed this visit by Provider                         Objective    Exam  /79 (BP Location: Right arm, Patient Position: Sitting, Cuff Size: Adult Small)   Pulse 82   Temp 97.9  F (36.6  C)   Resp 16   Ht 1.702 m (5' 7.01\")   Wt 60.2 kg (132 lb 12.8 oz)   SpO2 97%   BMI 20.79 kg/m     Estimated body mass index is 20.79 kg/m  as calculated from the following:    Height as of this encounter: 1.702 m (5' 7.01\").    Weight as of this encounter: 60.2 kg (132 lb 12.8 oz).    Physical Exam  GENERAL: alert and no distress  EYES: Eyes grossly normal to inspection, PERRL and conjunctivae and sclerae normal  RESP: lungs clear to auscultation - no rales, rhonchi or wheezes  CV: regular rate and rhythm, normal S1 S2, no S3 or S4, no murmur, click or rub, no peripheral edema  ABDOMEN: soft, nontender and bowel sounds normal   (female): normal female external genitalia, normal urethral meatus , normal vaginal mucosa, and normal cervix  MS: no gross musculoskeletal defects noted, no edema  PSYCH: mentation appears normal, affect normal/bright        Signed Electronically by: Sandra Morales MD    "

## 2025-07-30 LAB
HPV HR 12 DNA CVX QL NAA+PROBE: NEGATIVE
HPV16 DNA CVX QL NAA+PROBE: NEGATIVE
HPV18 DNA CVX QL NAA+PROBE: NEGATIVE
HUMAN PAPILLOMA VIRUS FINAL DIAGNOSIS: NORMAL

## 2025-08-04 LAB
BKR AP ASSOCIATED HPV REPORT: NORMAL
BKR LAB AP GYN ADEQUACY: NORMAL
BKR LAB AP GYN INTERPRETATION: NORMAL
BKR LAB AP PREVIOUS ABNORMAL: NORMAL
PATH REPORT.COMMENTS IMP SPEC: NORMAL
PATH REPORT.COMMENTS IMP SPEC: NORMAL
PATH REPORT.RELEVANT HX SPEC: NORMAL